# Patient Record
Sex: MALE | Race: WHITE | URBAN - METROPOLITAN AREA
[De-identification: names, ages, dates, MRNs, and addresses within clinical notes are randomized per-mention and may not be internally consistent; named-entity substitution may affect disease eponyms.]

---

## 2017-01-20 ENCOUNTER — OFFICE VISIT (OUTPATIENT)
Dept: FAMILY MEDICINE CLINIC | Age: 67
End: 2017-01-20

## 2017-01-20 VITALS
OXYGEN SATURATION: 98 % | HEIGHT: 70 IN | WEIGHT: 166 LBS | HEART RATE: 70 BPM | BODY MASS INDEX: 23.77 KG/M2 | DIASTOLIC BLOOD PRESSURE: 80 MMHG | SYSTOLIC BLOOD PRESSURE: 140 MMHG

## 2017-01-20 DIAGNOSIS — J30.9 ALLERGIC SINUSITIS: ICD-10-CM

## 2017-01-20 DIAGNOSIS — J01.00 ACUTE NON-RECURRENT MAXILLARY SINUSITIS: Primary | ICD-10-CM

## 2017-01-20 PROCEDURE — 1123F ACP DISCUSS/DSCN MKR DOCD: CPT | Performed by: FAMILY MEDICINE

## 2017-01-20 PROCEDURE — G8484 FLU IMMUNIZE NO ADMIN: HCPCS | Performed by: FAMILY MEDICINE

## 2017-01-20 PROCEDURE — 99203 OFFICE O/P NEW LOW 30 MIN: CPT | Performed by: FAMILY MEDICINE

## 2017-01-20 PROCEDURE — 3017F COLORECTAL CA SCREEN DOC REV: CPT | Performed by: FAMILY MEDICINE

## 2017-01-20 PROCEDURE — 4040F PNEUMOC VAC/ADMIN/RCVD: CPT | Performed by: FAMILY MEDICINE

## 2017-01-20 PROCEDURE — 1036F TOBACCO NON-USER: CPT | Performed by: FAMILY MEDICINE

## 2017-01-20 PROCEDURE — G8427 DOCREV CUR MEDS BY ELIG CLIN: HCPCS | Performed by: FAMILY MEDICINE

## 2017-01-20 PROCEDURE — G8420 CALC BMI NORM PARAMETERS: HCPCS | Performed by: FAMILY MEDICINE

## 2017-01-20 RX ORDER — DILTIAZEM HYDROCHLORIDE 240 MG/1
240 CAPSULE, EXTENDED RELEASE ORAL DAILY
COMMUNITY

## 2017-01-20 RX ORDER — CETIRIZINE HYDROCHLORIDE 10 MG/1
10 TABLET ORAL DAILY
Qty: 30 TABLET | Refills: 5 | Status: SHIPPED | OUTPATIENT
Start: 2017-01-20

## 2017-01-20 RX ORDER — AZITHROMYCIN 250 MG/1
TABLET, FILM COATED ORAL
Qty: 1 PACKET | Refills: 0 | Status: SHIPPED | OUTPATIENT
Start: 2017-01-20 | End: 2017-01-30

## 2017-01-20 ASSESSMENT — ENCOUNTER SYMPTOMS
COUGH: 0
EYE DISCHARGE: 0
ABDOMINAL PAIN: 0
SINUS PRESSURE: 1
CHEST TIGHTNESS: 0
FACIAL SWELLING: 0
SINUS COMPLAINT: 1
SHORTNESS OF BREATH: 0
COLOR CHANGE: 0
ABDOMINAL DISTENTION: 0
RHINORRHEA: 1
EYE REDNESS: 0

## 2023-11-03 ENCOUNTER — OFFICE VISIT (OUTPATIENT)
Dept: URGENT CARE | Age: 73
End: 2023-11-03

## 2023-11-03 VITALS
DIASTOLIC BLOOD PRESSURE: 73 MMHG | TEMPERATURE: 98.7 F | OXYGEN SATURATION: 94 % | SYSTOLIC BLOOD PRESSURE: 144 MMHG | WEIGHT: 158 LBS | BODY MASS INDEX: 22.67 KG/M2 | RESPIRATION RATE: 16 BRPM | HEART RATE: 78 BPM

## 2023-11-03 DIAGNOSIS — R53.83 FATIGUE, UNSPECIFIED TYPE: ICD-10-CM

## 2023-11-03 DIAGNOSIS — M79.10 MYALGIA: Primary | ICD-10-CM

## 2023-11-03 LAB
INFLUENZA VIRUS A RNA: NEGATIVE
INFLUENZA VIRUS B RNA: NEGATIVE
Lab: NORMAL
QC PASS/FAIL: NORMAL
SARS-COV-2 RDRP RESP QL NAA+PROBE: NEGATIVE

## 2023-11-03 RX ORDER — DIAZEPAM 10 MG/1
10 TABLET ORAL
COMMUNITY
Start: 2023-09-22

## 2023-11-03 RX ORDER — LOPERAMIDE HYDROCHLORIDE 2 MG/1
2 CAPSULE ORAL
COMMUNITY
Start: 2023-07-26

## 2023-11-03 RX ORDER — ASPIRIN 81 MG/1
1 TABLET ORAL DAILY
COMMUNITY
Start: 2019-05-16

## 2023-11-03 RX ORDER — LORATADINE 10 MG/1
10 TABLET ORAL
COMMUNITY
Start: 2023-09-25

## 2023-11-03 RX ORDER — QUETIAPINE FUMARATE 25 MG/1
25 TABLET, FILM COATED ORAL
COMMUNITY
Start: 2023-07-10

## 2023-11-03 RX ORDER — CARVEDILOL 6.25 MG/1
6.25 TABLET ORAL
COMMUNITY
Start: 2023-04-19

## 2023-11-03 RX ORDER — PRAVASTATIN SODIUM 40 MG
20 TABLET ORAL
COMMUNITY
Start: 2023-07-05

## 2023-11-03 ASSESSMENT — ENCOUNTER SYMPTOMS
NAUSEA: 0
DIARRHEA: 0
SORE THROAT: 0
RHINORRHEA: 0
SHORTNESS OF BREATH: 0
VOMITING: 0
COUGH: 0
ABDOMINAL PAIN: 0

## 2023-11-03 NOTE — PATIENT INSTRUCTIONS
In-clinic COVID and Flu testing negative. Continue use of Tylenol for breaking the chills episodes. Follow up with your PCP or return to the clinic in 5 days if symptoms persist or if symptoms worsen.

## 2023-11-03 NOTE — PROGRESS NOTES
Alli Castillo (: 1950) is a 75 y.o. male,New patient, here for evaluation of the following chief complaint(s):  Fever (Fevers and chills 3 days)      ASSESSMENT/PLAN:    ICD-10-CM    1. Myalgia  M79.10 POCT Influenza A/B DNA (Alere i)     POCT COVID-19 Rapid, NAAT      2. Fatigue, unspecified type  R53.83 POCT Influenza A/B DNA (Alere i)     POCT COVID-19 Rapid, NAAT          In-clinic COVID and Flu testing negative. Exam reassuring at this time with no ill findings. Likely low-level viral infection. Recommend continuing use of Tylenol to break symptoms, as necessary. Follow up with your PCP or return to the clinic in 5 days if symptoms persist or if symptoms worsen. Discussed pt's elevated BP noted during initial vital signs assessment - pt notes concern for having history of white coat syndrome. Pt does not he has taken his BP medications today, as directed. Discussed continued at-home monitoring of BP and follow up with PCP should elevated BP readings persist at home. If headaches, worsened back pain, abdominal pain, chest pain, shortness of breath, weakness, numbness, or worsened concerns develop, follow up with the ER for further evaluation. SUBJECTIVE/OBJECTIVE:  HPI:   75 y.o. male presents with his wife for complaint of fevers (subjective per the wife) and chills x 4 days. Notes concerns for having the flu. States the chills will last an hour or so, then resolve. Also notes having body aches. States will take Tylenol when he first notes having the chills, then they resolve in roughly an hour, and notes feeling much better once the chills resolve. States episodes of chills occur 2-3 times per day, at least one episode during the day, and one in the evening. States feels quite well between the episodes. Pt is asking for COVID and Flu testing.       History provided by:  Patient   used: No        VITAL SIGNS  Vitals:    23 0955   BP: (!) 144/73   Pulse:

## 2023-11-07 ENCOUNTER — OFFICE VISIT (OUTPATIENT)
Dept: URGENT CARE | Age: 73
End: 2023-11-07

## 2023-11-07 VITALS
TEMPERATURE: 98.8 F | HEART RATE: 75 BPM | OXYGEN SATURATION: 94 % | DIASTOLIC BLOOD PRESSURE: 75 MMHG | WEIGHT: 158.8 LBS | BODY MASS INDEX: 22.73 KG/M2 | HEIGHT: 70 IN | SYSTOLIC BLOOD PRESSURE: 148 MMHG

## 2023-11-07 DIAGNOSIS — R31.9 URINARY TRACT INFECTION WITH HEMATURIA, SITE UNSPECIFIED: Primary | ICD-10-CM

## 2023-11-07 DIAGNOSIS — R03.0 ELEVATED BLOOD PRESSURE READING: ICD-10-CM

## 2023-11-07 DIAGNOSIS — N39.0 URINARY TRACT INFECTION WITH HEMATURIA, SITE UNSPECIFIED: Primary | ICD-10-CM

## 2023-11-07 DIAGNOSIS — R30.9 PAINFUL URINATION: ICD-10-CM

## 2023-11-07 PROBLEM — I25.10 CORONARY ATHEROSCLEROSIS: Status: ACTIVE | Noted: 2023-11-07

## 2023-11-07 PROBLEM — C18.9 MALIGNANT NEOPLASM OF COLON, UNSPECIFIED (HCC): Status: ACTIVE | Noted: 2023-11-07

## 2023-11-07 PROBLEM — C22.9 MALIGNANT NEOPLASM OF LIVER (HCC): Status: ACTIVE | Noted: 2023-11-07

## 2023-11-07 PROBLEM — F33.1 MAJOR DEPRESSIVE DISORDER, RECURRENT, MODERATE (HCC): Status: ACTIVE | Noted: 2023-11-07

## 2023-11-07 PROBLEM — H65.00 ACUTE SEROUS OTITIS MEDIA: Status: ACTIVE | Noted: 2023-11-07

## 2023-11-07 LAB
APPEARANCE FLUID: ABNORMAL
BILIRUBIN, POC: NEGATIVE
BLOOD URINE, POC: ABNORMAL
CLARITY, POC: ABNORMAL
COLOR, POC: ABNORMAL
GLUCOSE URINE, POC: NEGATIVE
KETONES, POC: NEGATIVE
LEUKOCYTE EST, POC: ABNORMAL
NITRITE, POC: ABNORMAL
PH, POC: 6
PROTEIN, POC: ABNORMAL
SPECIFIC GRAVITY, POC: >=1.03
UROBILINOGEN, POC: ABNORMAL

## 2023-11-07 RX ORDER — SULFAMETHOXAZOLE AND TRIMETHOPRIM 800; 160 MG/1; MG/1
1 TABLET ORAL 2 TIMES DAILY
Qty: 20 TABLET | Refills: 0 | Status: SHIPPED | OUTPATIENT
Start: 2023-11-07 | End: 2023-11-17

## 2023-11-07 RX ORDER — CEPHALEXIN 500 MG/1
500 CAPSULE ORAL 2 TIMES DAILY
Qty: 14 CAPSULE | Refills: 0 | Status: CANCELLED | OUTPATIENT
Start: 2023-11-07 | End: 2023-11-14

## 2023-11-07 ASSESSMENT — ENCOUNTER SYMPTOMS
ABDOMINAL PAIN: 0
CHEST TIGHTNESS: 0
SHORTNESS OF BREATH: 0
NAUSEA: 0
VOMITING: 0
DIARRHEA: 0
CONSTIPATION: 0
COUGH: 0

## 2023-11-07 NOTE — PATIENT INSTRUCTIONS
Please go to Emergency department if you have worsening pain, worsening bleeding.    Please follow up with your family doctor to ensure the resolution  We have sent your urine out for a culture and we will call with results to ensure that you are on correct antibiotics

## 2023-11-10 LAB
BACTERIA UR CULT: ABNORMAL
ORGANISM: ABNORMAL

## 2023-11-13 ENCOUNTER — TELEPHONE (OUTPATIENT)
Dept: FAMILY MEDICINE CLINIC | Age: 73
End: 2023-11-13

## 2023-11-13 NOTE — TELEPHONE ENCOUNTER
----- Message from Betzy Bustos sent at 11/13/2023 12:34 PM EST -----  Subject: Appointment Request    Reason for Call: New Patient/New to Provider Appointment needed: Routine   ED Follow Up Visit    QUESTIONS    Reason for appointment request? No appointments available during search     Additional Information for Provider? Patient needs a urgent care f/u. Has   seen Dr. Ron Avila in the past, who sees his wife Collette Eve. Patient   usually goes to the [place] but would like to establish care and schedule a f/u   appt for a kidney infection.  Please call to advise.   ---------------------------------------------------------------------------  --------------  Grace Marker INFO  [phone]; OK to leave message on voicemail  ---------------------------------------------------------------------------  --------------  SCRIPT ANSWERS

## 2023-11-17 ENCOUNTER — OFFICE VISIT (OUTPATIENT)
Dept: FAMILY MEDICINE CLINIC | Age: 73
End: 2023-11-17
Payer: MEDICARE

## 2023-11-17 VITALS
DIASTOLIC BLOOD PRESSURE: 77 MMHG | HEART RATE: 52 BPM | WEIGHT: 158.4 LBS | SYSTOLIC BLOOD PRESSURE: 124 MMHG | HEIGHT: 70 IN | BODY MASS INDEX: 22.68 KG/M2 | OXYGEN SATURATION: 99 %

## 2023-11-17 DIAGNOSIS — R33.9 RETENTION OF URINE: Primary | ICD-10-CM

## 2023-11-17 DIAGNOSIS — R30.0 DYSURIA: ICD-10-CM

## 2023-11-17 LAB
BILIRUBIN, POC: NEGATIVE
BLOOD URINE, POC: NEGATIVE
CLARITY, POC: CLEAR
COLOR, POC: YELLOW
GLUCOSE URINE, POC: NEGATIVE
KETONES, POC: NEGATIVE
LEUKOCYTE EST, POC: NEGATIVE
NITRITE, POC: NEGATIVE
PH, POC: 5.5
PROTEIN, POC: NEGATIVE
SPECIFIC GRAVITY, POC: 1.02
UROBILINOGEN, POC: 0.2

## 2023-11-17 PROCEDURE — G8427 DOCREV CUR MEDS BY ELIG CLIN: HCPCS | Performed by: SURGERY

## 2023-11-17 PROCEDURE — 99203 OFFICE O/P NEW LOW 30 MIN: CPT | Performed by: SURGERY

## 2023-11-17 PROCEDURE — 3074F SYST BP LT 130 MM HG: CPT | Performed by: SURGERY

## 2023-11-17 PROCEDURE — 3078F DIAST BP <80 MM HG: CPT | Performed by: SURGERY

## 2023-11-17 PROCEDURE — G8420 CALC BMI NORM PARAMETERS: HCPCS | Performed by: SURGERY

## 2023-11-17 PROCEDURE — 1036F TOBACCO NON-USER: CPT | Performed by: SURGERY

## 2023-11-17 PROCEDURE — 1123F ACP DISCUSS/DSCN MKR DOCD: CPT | Performed by: SURGERY

## 2023-11-17 PROCEDURE — G8484 FLU IMMUNIZE NO ADMIN: HCPCS | Performed by: SURGERY

## 2023-11-17 PROCEDURE — 3017F COLORECTAL CA SCREEN DOC REV: CPT | Performed by: SURGERY

## 2023-11-17 PROCEDURE — 81002 URINALYSIS NONAUTO W/O SCOPE: CPT | Performed by: SURGERY

## 2023-11-17 RX ORDER — TAMSULOSIN HYDROCHLORIDE 0.4 MG/1
0.4 CAPSULE ORAL DAILY
Qty: 30 CAPSULE | Refills: 0 | Status: SHIPPED | OUTPATIENT
Start: 2023-11-17

## 2023-11-17 SDOH — ECONOMIC STABILITY: INCOME INSECURITY: HOW HARD IS IT FOR YOU TO PAY FOR THE VERY BASICS LIKE FOOD, HOUSING, MEDICAL CARE, AND HEATING?: NOT HARD AT ALL

## 2023-11-17 SDOH — ECONOMIC STABILITY: HOUSING INSECURITY
IN THE LAST 12 MONTHS, WAS THERE A TIME WHEN YOU DID NOT HAVE A STEADY PLACE TO SLEEP OR SLEPT IN A SHELTER (INCLUDING NOW)?: NO

## 2023-11-17 SDOH — ECONOMIC STABILITY: FOOD INSECURITY: WITHIN THE PAST 12 MONTHS, YOU WORRIED THAT YOUR FOOD WOULD RUN OUT BEFORE YOU GOT MONEY TO BUY MORE.: NEVER TRUE

## 2023-11-17 SDOH — ECONOMIC STABILITY: FOOD INSECURITY: WITHIN THE PAST 12 MONTHS, THE FOOD YOU BOUGHT JUST DIDN'T LAST AND YOU DIDN'T HAVE MONEY TO GET MORE.: NEVER TRUE

## 2023-11-17 ASSESSMENT — PATIENT HEALTH QUESTIONNAIRE - PHQ9
SUM OF ALL RESPONSES TO PHQ QUESTIONS 1-9: 2
2. FEELING DOWN, DEPRESSED OR HOPELESS: 1
SUM OF ALL RESPONSES TO PHQ QUESTIONS 1-9: 2
9. THOUGHTS THAT YOU WOULD BE BETTER OFF DEAD, OR OF HURTING YOURSELF: 0
6. FEELING BAD ABOUT YOURSELF - OR THAT YOU ARE A FAILURE OR HAVE LET YOURSELF OR YOUR FAMILY DOWN: 0
5. POOR APPETITE OR OVEREATING: 0
4. FEELING TIRED OR HAVING LITTLE ENERGY: 0
3. TROUBLE FALLING OR STAYING ASLEEP: 1
SUM OF ALL RESPONSES TO PHQ QUESTIONS 1-9: 2
8. MOVING OR SPEAKING SO SLOWLY THAT OTHER PEOPLE COULD HAVE NOTICED. OR THE OPPOSITE, BEING SO FIGETY OR RESTLESS THAT YOU HAVE BEEN MOVING AROUND A LOT MORE THAN USUAL: 0
10. IF YOU CHECKED OFF ANY PROBLEMS, HOW DIFFICULT HAVE THESE PROBLEMS MADE IT FOR YOU TO DO YOUR WORK, TAKE CARE OF THINGS AT HOME, OR GET ALONG WITH OTHER PEOPLE: 0
1. LITTLE INTEREST OR PLEASURE IN DOING THINGS: 0
SUM OF ALL RESPONSES TO PHQ9 QUESTIONS 1 & 2: 1
SUM OF ALL RESPONSES TO PHQ QUESTIONS 1-9: 2
7. TROUBLE CONCENTRATING ON THINGS, SUCH AS READING THE NEWSPAPER OR WATCHING TELEVISION: 0

## 2023-11-17 ASSESSMENT — ENCOUNTER SYMPTOMS
CONSTIPATION: 0
NAUSEA: 0
SORE THROAT: 0
DIARRHEA: 0
ABDOMINAL PAIN: 0
SHORTNESS OF BREATH: 0
COUGH: 0

## 2023-11-17 NOTE — PROGRESS NOTES
Hunger Vital Sign     Worried About Running Out of Food in the Last Year: Never true     Ran Out of Food in the Last Year: Never true   Transportation Needs: Unknown (11/17/2023)    PRAPARE - Transportation     Lack of Transportation (Medical): Not on file     Lack of Transportation (Non-Medical): No   Physical Activity: Not on file   Stress: Not on file   Social Connections: Not on file   Intimate Partner Violence: Not on file   Housing Stability: Unknown (11/17/2023)    Housing Stability Vital Sign     Unable to Pay for Housing in the Last Year: Not on file     Number of Places Lived in the Last Year: Not on file     Unstable Housing in the Last Year: No       Family History   Problem Relation Age of Onset    Diabetes Mother     Stroke Mother     Heart Disease Father     High Blood Pressure Father     Cancer Sister     Diabetes Sister     Cancer Brother     Diabetes Brother        Current Outpatient Medications   Medication Sig Dispense Refill    tamsulosin (FLOMAX) 0.4 MG capsule Take 1 capsule by mouth daily 30 capsule 0    aspirin 81 MG EC tablet Take 1 tablet by mouth daily      carvedilol (COREG) 6.25 MG tablet 1 tablet      pravastatin (PRAVACHOL) 40 MG tablet 0.5 tablets      QUEtiapine (SEROQUEL) 25 MG tablet 1 tablet      loperamide (IMODIUM) 2 MG capsule 1 capsule      loratadine (CLARITIN) 10 MG tablet 1 tablet      diltiazem (DILACOR XR) 240 MG extended release capsule Take 1 capsule by mouth daily      cetirizine (ZYRTEC ALLERGY) 10 MG tablet Take 1 tablet by mouth daily 30 tablet 5     No current facility-administered medications for this visit.        Allergies   Allergen Reactions    Penicillins Hives    Gemfibrozil Other (See Comments)       Office Visit on 11/07/2023   Component Date Value Ref Range Status    Color, UA 11/07/2023 Dark Yellow   Final    Glucose, UA POC 11/07/2023 Negative   Final    Bilirubin, UA 11/07/2023 Negative   Final    Ketones, UA 11/07/2023 Negative   Final    Spec

## 2023-11-29 ENCOUNTER — OFFICE VISIT (OUTPATIENT)
Dept: FAMILY MEDICINE CLINIC | Age: 73
End: 2023-11-29
Payer: MEDICARE

## 2023-11-29 VITALS
OXYGEN SATURATION: 96 % | SYSTOLIC BLOOD PRESSURE: 138 MMHG | TEMPERATURE: 97.1 F | DIASTOLIC BLOOD PRESSURE: 74 MMHG | HEART RATE: 69 BPM | BODY MASS INDEX: 23.14 KG/M2 | RESPIRATION RATE: 16 BRPM | WEIGHT: 159 LBS

## 2023-11-29 DIAGNOSIS — R33.9 URINARY RETENTION: Primary | ICD-10-CM

## 2023-11-29 DIAGNOSIS — R31.9 HEMATURIA, UNSPECIFIED TYPE: ICD-10-CM

## 2023-11-29 DIAGNOSIS — N30.01 ACUTE CYSTITIS WITH HEMATURIA: ICD-10-CM

## 2023-11-29 LAB
BILIRUBIN, POC: NEGATIVE
BLOOD URINE, POC: NORMAL
CLARITY, POC: NORMAL
COLOR, POC: NORMAL
GLUCOSE URINE, POC: NEGATIVE
KETONES, POC: NEGATIVE
LEUKOCYTE EST, POC: NORMAL
NITRITE, POC: POSITIVE
PH, POC: 5.5
PROTEIN, POC: NORMAL
SPECIFIC GRAVITY, POC: >=1.03
UROBILINOGEN, POC: NORMAL

## 2023-11-29 PROCEDURE — 99213 OFFICE O/P EST LOW 20 MIN: CPT | Performed by: FAMILY MEDICINE

## 2023-11-29 PROCEDURE — 3075F SYST BP GE 130 - 139MM HG: CPT | Performed by: FAMILY MEDICINE

## 2023-11-29 PROCEDURE — 1123F ACP DISCUSS/DSCN MKR DOCD: CPT | Performed by: FAMILY MEDICINE

## 2023-11-29 PROCEDURE — 3078F DIAST BP <80 MM HG: CPT | Performed by: FAMILY MEDICINE

## 2023-11-29 PROCEDURE — 81003 URINALYSIS AUTO W/O SCOPE: CPT | Performed by: FAMILY MEDICINE

## 2023-11-29 PROCEDURE — 1036F TOBACCO NON-USER: CPT | Performed by: FAMILY MEDICINE

## 2023-11-29 PROCEDURE — G8427 DOCREV CUR MEDS BY ELIG CLIN: HCPCS | Performed by: FAMILY MEDICINE

## 2023-11-29 PROCEDURE — G8484 FLU IMMUNIZE NO ADMIN: HCPCS | Performed by: FAMILY MEDICINE

## 2023-11-29 PROCEDURE — G8420 CALC BMI NORM PARAMETERS: HCPCS | Performed by: FAMILY MEDICINE

## 2023-11-29 PROCEDURE — 3017F COLORECTAL CA SCREEN DOC REV: CPT | Performed by: FAMILY MEDICINE

## 2023-11-29 RX ORDER — SULFAMETHOXAZOLE AND TRIMETHOPRIM 800; 160 MG/1; MG/1
1 TABLET ORAL 2 TIMES DAILY
Qty: 20 TABLET | Refills: 0 | Status: SHIPPED | OUTPATIENT
Start: 2023-11-29 | End: 2023-12-09

## 2023-11-29 ASSESSMENT — COLUMBIA-SUICIDE SEVERITY RATING SCALE - C-SSRS
7. DID THIS OCCUR IN THE LAST THREE MONTHS: NO
3. HAVE YOU BEEN THINKING ABOUT HOW YOU MIGHT KILL YOURSELF?: NO
5. HAVE YOU STARTED TO WORK OUT OR WORKED OUT THE DETAILS OF HOW TO KILL YOURSELF? DO YOU INTEND TO CARRY OUT THIS PLAN?: NO
4. HAVE YOU HAD THESE THOUGHTS AND HAD SOME INTENTION OF ACTING ON THEM?: NO

## 2023-11-29 ASSESSMENT — PATIENT HEALTH QUESTIONNAIRE - PHQ9
1. LITTLE INTEREST OR PLEASURE IN DOING THINGS: 0
5. POOR APPETITE OR OVEREATING: 0
2. FEELING DOWN, DEPRESSED OR HOPELESS: 0
4. FEELING TIRED OR HAVING LITTLE ENERGY: 0
10. IF YOU CHECKED OFF ANY PROBLEMS, HOW DIFFICULT HAVE THESE PROBLEMS MADE IT FOR YOU TO DO YOUR WORK, TAKE CARE OF THINGS AT HOME, OR GET ALONG WITH OTHER PEOPLE: 0
6. FEELING BAD ABOUT YOURSELF - OR THAT YOU ARE A FAILURE OR HAVE LET YOURSELF OR YOUR FAMILY DOWN: 0
7. TROUBLE CONCENTRATING ON THINGS, SUCH AS READING THE NEWSPAPER OR WATCHING TELEVISION: 0
SUM OF ALL RESPONSES TO PHQ9 QUESTIONS 1 & 2: 0
8. MOVING OR SPEAKING SO SLOWLY THAT OTHER PEOPLE COULD HAVE NOTICED. OR THE OPPOSITE, BEING SO FIGETY OR RESTLESS THAT YOU HAVE BEEN MOVING AROUND A LOT MORE THAN USUAL: 0
SUM OF ALL RESPONSES TO PHQ QUESTIONS 1-9: 0
3. TROUBLE FALLING OR STAYING ASLEEP: 0
SUM OF ALL RESPONSES TO PHQ QUESTIONS 1-9: 0
9. THOUGHTS THAT YOU WOULD BE BETTER OFF DEAD, OR OF HURTING YOURSELF: 0
SUM OF ALL RESPONSES TO PHQ QUESTIONS 1-9: 0
SUM OF ALL RESPONSES TO PHQ QUESTIONS 1-9: 0

## 2023-12-02 LAB
BACTERIA UR CULT: ABNORMAL
ORGANISM: ABNORMAL

## 2024-01-02 ENCOUNTER — TELEPHONE (OUTPATIENT)
Dept: FAMILY MEDICINE CLINIC | Age: 74
End: 2024-01-02

## 2024-01-02 DIAGNOSIS — R53.83 FATIGUE, UNSPECIFIED TYPE: Primary | ICD-10-CM

## 2024-01-02 NOTE — TELEPHONE ENCOUNTER
Patient's spouse called in an stated the patient has not been feeling well for a long time, she states he has no energy, can't hardly stand up, sleeps a lot and just very weak. She states the patient is requesting Nubia Davila DO to call in orders for blood work, to see if he has a underlying infection, appointment offered, she stated he was just in last month.       LOV: 12/22/2023      No future appointments.

## 2024-01-05 DIAGNOSIS — R53.83 FATIGUE, UNSPECIFIED TYPE: ICD-10-CM

## 2024-01-05 LAB
ALBUMIN SERPL-MCNC: 4.1 G/DL (ref 3.4–5)
ALBUMIN/GLOB SERPL: 1.2 {RATIO} (ref 1.1–2.2)
ALP SERPL-CCNC: 62 U/L (ref 40–129)
ALT SERPL-CCNC: 8 U/L (ref 10–40)
ANION GAP SERPL CALCULATED.3IONS-SCNC: 14 MMOL/L (ref 3–16)
AST SERPL-CCNC: 13 U/L (ref 15–37)
BASOPHILS # BLD: 0 K/UL (ref 0–0.2)
BASOPHILS NFR BLD: 0.1 %
BILIRUB SERPL-MCNC: 0.8 MG/DL (ref 0–1)
BUN SERPL-MCNC: 12 MG/DL (ref 7–20)
CALCIUM SERPL-MCNC: 9.1 MG/DL (ref 8.3–10.6)
CHLORIDE SERPL-SCNC: 100 MMOL/L (ref 99–110)
CO2 SERPL-SCNC: 24 MMOL/L (ref 21–32)
CREAT SERPL-MCNC: 1 MG/DL (ref 0.8–1.3)
DEPRECATED RDW RBC AUTO: 14.2 % (ref 12.4–15.4)
EOSINOPHIL # BLD: 0 K/UL (ref 0–0.6)
EOSINOPHIL NFR BLD: 0.4 %
GFR SERPLBLD CREATININE-BSD FMLA CKD-EPI: >60 ML/MIN/{1.73_M2}
GLUCOSE SERPL-MCNC: 115 MG/DL (ref 70–99)
HCT VFR BLD AUTO: 39.8 % (ref 40.5–52.5)
HGB BLD-MCNC: 13.7 G/DL (ref 13.5–17.5)
LYMPHOCYTES # BLD: 2 K/UL (ref 1–5.1)
LYMPHOCYTES NFR BLD: 30.8 %
MCH RBC QN AUTO: 30.8 PG (ref 26–34)
MCHC RBC AUTO-ENTMCNC: 34.3 G/DL (ref 31–36)
MCV RBC AUTO: 89.8 FL (ref 80–100)
MONOCYTES # BLD: 0.6 K/UL (ref 0–1.3)
MONOCYTES NFR BLD: 8.5 %
NEUTROPHILS # BLD: 3.9 K/UL (ref 1.7–7.7)
NEUTROPHILS NFR BLD: 60.2 %
PLATELET # BLD AUTO: 167 K/UL (ref 135–450)
PMV BLD AUTO: 8.1 FL (ref 5–10.5)
POTASSIUM SERPL-SCNC: 3.7 MMOL/L (ref 3.5–5.1)
PROT SERPL-MCNC: 7.5 G/DL (ref 6.4–8.2)
RBC # BLD AUTO: 4.44 M/UL (ref 4.2–5.9)
SODIUM SERPL-SCNC: 138 MMOL/L (ref 136–145)
TSH SERPL DL<=0.005 MIU/L-ACNC: 3.72 UIU/ML (ref 0.27–4.2)
WBC # BLD AUTO: 6.5 K/UL (ref 4–11)

## 2024-01-25 DIAGNOSIS — R33.9 RETENTION OF URINE: ICD-10-CM

## 2024-01-25 RX ORDER — TAMSULOSIN HYDROCHLORIDE 0.4 MG/1
0.4 CAPSULE ORAL DAILY
Qty: 30 CAPSULE | Refills: 0 | Status: SHIPPED | OUTPATIENT
Start: 2024-01-25

## 2024-02-15 LAB
C DIFFICILE TOXINS, PCR: NORMAL
SOURCE: NORMAL

## 2024-02-22 ENCOUNTER — OFFICE VISIT (OUTPATIENT)
Dept: FAMILY MEDICINE CLINIC | Age: 74
End: 2024-02-22
Payer: MEDICARE

## 2024-02-22 VITALS
BODY MASS INDEX: 23.43 KG/M2 | WEIGHT: 158.2 LBS | OXYGEN SATURATION: 95 % | SYSTOLIC BLOOD PRESSURE: 127 MMHG | HEIGHT: 69 IN | DIASTOLIC BLOOD PRESSURE: 75 MMHG | HEART RATE: 68 BPM

## 2024-02-22 DIAGNOSIS — Z01.818 PREOP EXAMINATION: Primary | ICD-10-CM

## 2024-02-22 PROCEDURE — 99213 OFFICE O/P EST LOW 20 MIN: CPT | Performed by: SURGERY

## 2024-02-22 PROCEDURE — 93000 ELECTROCARDIOGRAM COMPLETE: CPT | Performed by: SURGERY

## 2024-02-22 PROCEDURE — G8420 CALC BMI NORM PARAMETERS: HCPCS | Performed by: SURGERY

## 2024-02-22 PROCEDURE — 1123F ACP DISCUSS/DSCN MKR DOCD: CPT | Performed by: SURGERY

## 2024-02-22 PROCEDURE — G8427 DOCREV CUR MEDS BY ELIG CLIN: HCPCS | Performed by: SURGERY

## 2024-02-22 PROCEDURE — 3078F DIAST BP <80 MM HG: CPT | Performed by: SURGERY

## 2024-02-22 PROCEDURE — G8484 FLU IMMUNIZE NO ADMIN: HCPCS | Performed by: SURGERY

## 2024-02-22 PROCEDURE — 1036F TOBACCO NON-USER: CPT | Performed by: SURGERY

## 2024-02-22 PROCEDURE — 3017F COLORECTAL CA SCREEN DOC REV: CPT | Performed by: SURGERY

## 2024-02-22 PROCEDURE — 3074F SYST BP LT 130 MM HG: CPT | Performed by: SURGERY

## 2024-02-22 ASSESSMENT — PATIENT HEALTH QUESTIONNAIRE - PHQ9
4. FEELING TIRED OR HAVING LITTLE ENERGY: 3
3. TROUBLE FALLING OR STAYING ASLEEP: 0
SUM OF ALL RESPONSES TO PHQ9 QUESTIONS 1 & 2: 0
8. MOVING OR SPEAKING SO SLOWLY THAT OTHER PEOPLE COULD HAVE NOTICED. OR THE OPPOSITE, BEING SO FIGETY OR RESTLESS THAT YOU HAVE BEEN MOVING AROUND A LOT MORE THAN USUAL: 0
SUM OF ALL RESPONSES TO PHQ QUESTIONS 1-9: 4
SUM OF ALL RESPONSES TO PHQ QUESTIONS 1-9: 4
7. TROUBLE CONCENTRATING ON THINGS, SUCH AS READING THE NEWSPAPER OR WATCHING TELEVISION: 0
2. FEELING DOWN, DEPRESSED OR HOPELESS: 0
6. FEELING BAD ABOUT YOURSELF - OR THAT YOU ARE A FAILURE OR HAVE LET YOURSELF OR YOUR FAMILY DOWN: 0
10. IF YOU CHECKED OFF ANY PROBLEMS, HOW DIFFICULT HAVE THESE PROBLEMS MADE IT FOR YOU TO DO YOUR WORK, TAKE CARE OF THINGS AT HOME, OR GET ALONG WITH OTHER PEOPLE: 0
5. POOR APPETITE OR OVEREATING: 1
1. LITTLE INTEREST OR PLEASURE IN DOING THINGS: 0
9. THOUGHTS THAT YOU WOULD BE BETTER OFF DEAD, OR OF HURTING YOURSELF: 0
SUM OF ALL RESPONSES TO PHQ QUESTIONS 1-9: 4
SUM OF ALL RESPONSES TO PHQ QUESTIONS 1-9: 4

## 2024-02-22 ASSESSMENT — ENCOUNTER SYMPTOMS
SORE THROAT: 0
NAUSEA: 0
DIARRHEA: 0
COUGH: 0
SHORTNESS OF BREATH: 0
ABDOMINAL PAIN: 0
CONSTIPATION: 0

## 2024-02-22 NOTE — PROGRESS NOTES
and Affect: Mood normal.           DATA:  EKG:  Date:  2/22/24    I have reviewed EKG with the following interpretation:    Impression:  Sinus rhythm with baseline artifact (likely due to body hair)      Labs ordered by surgeon    ASSESSMENT AND PLAN:    1.  Patient is a 73 y.o. male with above specified procedure [facility] 3[facility]4/24 [facility]   They will not require cardiology evaluation prior to procedure.     2. Stop NSAID medications 7-10 days prior to procedure.    3. Cleared for surgery.        Nubia Goldberg, DO

## 2024-02-23 NOTE — PROGRESS NOTES
Moderate caliber and no disease. Tortuous vessel. No          disease.     CFX: Large caliber and dominant. No disease. 2 moderate          caliber OMs. Small caliber LPDA without stenosis.            Assessment:      1. Pre-operative evaluation  2. Exertional dyspnea  3. Hematuria  4. Non-ischemic cardiomyopathy  5. Essential hypertension  6. Hyperlipidemia     Plan:      1. The [facility] but says that he has been seen by multiple providers, some of whom have been outside the [facility] and he is unable to provide any names for these providers.  Per review of his available records in Epic, he had a TTE in 2009 that showed an LVEF of 40% with inferior, septal, and inferolateral hypokinesis.  Invasive angiography performed around that time showed normal coronary arteries.  LVEF was reported as 50% on left ventriculography at that time.  He reports that he had an echocardiogram and underwent invasive coronary angiography in October in [place], but cannot identify where specifically he had the echo and angiogram performed.  He says that he did not have to have any stents placed.  He denies any chest pain, but some report some exertional dyspnea.  His EKG from 2/22/24 shows normal sinus rhythm with non-specific intraventricular conduction delay and no ischemic changes or evidence of previous infarct.  He currently appears to be near a clinically euvolemic state.  He is taking aspirin, pravastatin, coreg, and diltiazem, but does not appear to be have been on an ACEI or ARB.  2. Cystoscopy is considered a low risk procedure, but will work on obtaining outside records from [facility] before formally commenting on cardiac risk.  3. Can continue aspirin 81 mg daily, pravastatin 20 mg daily, coreg 6.25 mg BID, and diltiazem 240 mg daily.  4. If LVEF found to be reduced on most recent reported TTE, can plan to add ACEI/ARB and can probable discontinue diltiazem.  5.

## 2024-02-26 ENCOUNTER — OFFICE VISIT (OUTPATIENT)
Dept: CARDIOLOGY CLINIC | Age: 74
End: 2024-02-26

## 2024-02-26 ENCOUNTER — TELEPHONE (OUTPATIENT)
Dept: CARDIOLOGY CLINIC | Age: 74
End: 2024-02-26

## 2024-02-26 VITALS
OXYGEN SATURATION: 99 % | SYSTOLIC BLOOD PRESSURE: 136 MMHG | BODY MASS INDEX: 23.25 KG/M2 | HEART RATE: 54 BPM | WEIGHT: 157 LBS | DIASTOLIC BLOOD PRESSURE: 66 MMHG | HEIGHT: 69 IN

## 2024-02-26 DIAGNOSIS — I42.8 NON-ISCHEMIC CARDIOMYOPATHY (HCC): ICD-10-CM

## 2024-02-26 DIAGNOSIS — R31.9 HEMATURIA, UNSPECIFIED TYPE: ICD-10-CM

## 2024-02-26 DIAGNOSIS — I10 ESSENTIAL HYPERTENSION: ICD-10-CM

## 2024-02-26 DIAGNOSIS — Z01.818 PRE-OP EVALUATION: Primary | ICD-10-CM

## 2024-02-26 DIAGNOSIS — R06.09 EXERTIONAL DYSPNEA: ICD-10-CM

## 2024-02-26 DIAGNOSIS — E78.5 HYPERLIPIDEMIA, UNSPECIFIED HYPERLIPIDEMIA TYPE: ICD-10-CM

## 2024-02-26 NOTE — PATIENT INSTRUCTIONS
Continue aspirin 81 mg daily, Coreg 6.25 mg twice daily, diltiazem 240 mg daily and pravastatin 20 mg daily.   We will call over to the [facility] for your medical records ([address] [facility]). Dr Sarahi Harmon, DO   Patient is acceptable cardiac risk for proceeding with the planned cystoscopy surgery  Once we have obtained your medical records we will decide what further testing is needed.   Follow up with me in 3 months

## 2024-02-26 NOTE — TELEPHONE ENCOUNTER
Delgado Gaviria,     Patient was seen today in [place] and had no records. He says he is [facility] location ([address] [place] [address]) and sees Dr Sarahi Harmon DO. He said he had recent Stress test, angiogram and echocardiogram in October 2023. Can you please help obtaining records from [facility]. And let us know when anything is scanned in so Dr Steinberg can review. Thanks Khadra

## 2024-02-27 NOTE — TELEPHONE ENCOUNTER
Khadra, these records need to be obtained as soon as possible to assist with his pre-operative evaluation.  Thanks.

## 2024-02-28 DIAGNOSIS — I10 HYPERTENSION, UNSPECIFIED TYPE: ICD-10-CM

## 2024-02-28 DIAGNOSIS — R06.02 SHORTNESS OF BREATH: Primary | ICD-10-CM

## 2024-02-28 NOTE — PROGRESS NOTES
[facility] available [facility] records were obtained.  There is no record of him having a stress test or heart catheterization in the fall of last year as was reported by the patient during his recent visit.  He seemed to indicate that this was done at an outside facility, but could not identify the facility at which it was done in [place].  [facility] was [facility] in 2014 and it also appears that he had a nuclear SPECT stress test performed there in May 2019.    He was seen in clinic earlier this week as a new patient for a pre-operative cardiac evaluation prior to undergoing a cystoscopy.  Cystoscopy is overall considered a low-risk procedure and the patient is acceptable cardiac risk for proceeding.    He did report some exertional dyspnea and would recommend obtaining a GXT nuclear SPECT stress test for further cardiac risk stratification (OK to convert to Lexiscan if does not achieve target heart rate with exercise) as well as a TTE to re-evaluate his cardiac function.  Please note that these tests do not need to delay proceeding with his cystoscopy, as this is again a low risk procedure and patient is acceptable risk for proceeding at this time.      Gunner Steinberg, DO   [facility]

## 2024-02-29 NOTE — PROGRESS NOTES
Relayed to pt, pt v/u. CS number given, tests ordered. Letter sent to urology group per pt. Will scan when confirmation is received.

## 2024-03-05 RX ORDER — LEVOFLOXACIN 500 MG/1
500 TABLET, FILM COATED ORAL DAILY
COMMUNITY

## 2024-03-05 NOTE — PROGRESS NOTES
[facility] PRE-OPERATIVE INSTRUCTIONS       DOS: __3/7/2024__        Pre-Op Instructions     [x]  A History and Physical will be required within 30 days prior to surgery date. Some patients may require cardiac or pulmonary clearance. H&P will be completed DOS for Endo/colonoscopy patients.     [x]  Reviewed Medical and Surgical history, medication list, confirmed with patient any implants, allergies, bleeding disorders, JUAN and reactions to Anesthesia.    [x]  If there is a change in physical condition between now and the day of surgery, please notify your surgeon. This includes a cough, cold, fever, sore throat, nausea, vomiting and diarrhea. Also notify your surgeon if you experience dizziness, shortness of breath or blurred vision.    [x] Reviewed hx of C-Diff, MRSA, VRE and/or recent use of Antibiotics     [x]  All patients having a procedure must have a ride home by a responsible person that is over the age of 18 and ensure it is someone that we can share medical information with. After discharge, a responsible adult needs to stay with you for 24 hours. There is a limit of 2 adult visitors per room.     If unable to secure ride and/or care taker, please contact surgeon's office.      [x]  No alcohol, smoking or marijuana use 24 hours prior to surgery. Any use of recreational drugs must be stopped 5 days prior to surgery.     [x]  NPO after midnight (Any heart, BP, seizure, thyroid and breathing medications are okay to take the morning of surgery with a small sip of water).    The morning of surgery, you may brush your teeth, just no swallowing water. Also, NO gum, candy, mints or ice chips.    []  For Colonoscopy's, follow prep-instructions as indicated by physician.     []  Patients with a insulin pump, keep set on basal rate on day of surgery. Long-acting insulin should be administered the evening before, take only half of usual dose. Always check with prescribing doctor if there are any

## 2024-03-06 ENCOUNTER — ANESTHESIA EVENT (OUTPATIENT)
Age: 74
End: 2024-03-06
Payer: MEDICARE

## 2024-03-07 ENCOUNTER — ANESTHESIA (OUTPATIENT)
Age: 74
End: 2024-03-07
Payer: MEDICARE

## 2024-03-07 ENCOUNTER — HOSPITAL ENCOUNTER (OUTPATIENT)
Age: 74
Setting detail: OUTPATIENT SURGERY
Discharge: HOME | End: 2024-03-07
Attending: UROLOGY | Admitting: UROLOGY
Payer: MEDICARE

## 2024-03-07 VITALS
DIASTOLIC BLOOD PRESSURE: 61 MMHG | OXYGEN SATURATION: 100 % | HEIGHT: 69 IN | WEIGHT: 151.5 LBS | TEMPERATURE: 97.7 F | SYSTOLIC BLOOD PRESSURE: 140 MMHG | RESPIRATION RATE: 10 BRPM | BODY MASS INDEX: 22.44 KG/M2 | HEART RATE: 63 BPM

## 2024-03-07 DIAGNOSIS — G89.18 POSTOPERATIVE PAIN: Primary | ICD-10-CM

## 2024-03-07 LAB — GLUCOSE BLD-MCNC: 99 MG/DL (ref 70–99)

## 2024-03-07 PROCEDURE — 7100000001 HC PACU RECOVERY - ADDTL 15 MIN: Performed by: UROLOGY

## 2024-03-07 PROCEDURE — 2709999900 HC NON-CHARGEABLE SUPPLY: Performed by: UROLOGY

## 2024-03-07 PROCEDURE — 2500000003 HC RX 250 WO HCPCS

## 2024-03-07 PROCEDURE — 3700000000 HC ANESTHESIA ATTENDED CARE: Performed by: UROLOGY

## 2024-03-07 PROCEDURE — 3600000014 HC SURGERY LEVEL 4 ADDTL 15MIN: Performed by: UROLOGY

## 2024-03-07 PROCEDURE — 82962 GLUCOSE BLOOD TEST: CPT

## 2024-03-07 PROCEDURE — 2580000003 HC RX 258: Performed by: UROLOGY

## 2024-03-07 PROCEDURE — 2580000003 HC RX 258: Performed by: ANESTHESIOLOGY

## 2024-03-07 PROCEDURE — 7100000011 HC PHASE II RECOVERY - ADDTL 15 MIN: Performed by: UROLOGY

## 2024-03-07 PROCEDURE — 94640 AIRWAY INHALATION TREATMENT: CPT

## 2024-03-07 PROCEDURE — 3600000004 HC SURGERY LEVEL 4 BASE: Performed by: UROLOGY

## 2024-03-07 PROCEDURE — 2720000010 HC SURG SUPPLY STERILE: Performed by: UROLOGY

## 2024-03-07 PROCEDURE — 6360000002 HC RX W HCPCS

## 2024-03-07 PROCEDURE — 6360000002 HC RX W HCPCS: Performed by: ANESTHESIOLOGY

## 2024-03-07 PROCEDURE — 3700000001 HC ADD 15 MINUTES (ANESTHESIA): Performed by: UROLOGY

## 2024-03-07 PROCEDURE — 7100000010 HC PHASE II RECOVERY - FIRST 15 MIN: Performed by: UROLOGY

## 2024-03-07 PROCEDURE — 7100000000 HC PACU RECOVERY - FIRST 15 MIN: Performed by: UROLOGY

## 2024-03-07 PROCEDURE — 6360000002 HC RX W HCPCS: Performed by: UROLOGY

## 2024-03-07 RX ORDER — ONDANSETRON 2 MG/ML
4 INJECTION INTRAMUSCULAR; INTRAVENOUS
Status: DISCONTINUED | OUTPATIENT
Start: 2024-03-07 | End: 2024-03-07 | Stop reason: HOSPADM

## 2024-03-07 RX ORDER — SUCCINYLCHOLINE/SOD CL,ISO/PF 100 MG/5ML
SYRINGE (ML) INTRAVENOUS PRN
Status: DISCONTINUED | OUTPATIENT
Start: 2024-03-07 | End: 2024-03-07 | Stop reason: SDUPTHER

## 2024-03-07 RX ORDER — LIDOCAINE HYDROCHLORIDE 20 MG/ML
INJECTION, SOLUTION INTRAVENOUS PRN
Status: DISCONTINUED | OUTPATIENT
Start: 2024-03-07 | End: 2024-03-07 | Stop reason: SDUPTHER

## 2024-03-07 RX ORDER — SODIUM CHLORIDE 0.9 % (FLUSH) 0.9 %
5-40 SYRINGE (ML) INJECTION EVERY 12 HOURS SCHEDULED
Status: DISCONTINUED | OUTPATIENT
Start: 2024-03-07 | End: 2024-03-07 | Stop reason: HOSPADM

## 2024-03-07 RX ORDER — SODIUM CHLORIDE 9 MG/ML
INJECTION, SOLUTION INTRAVENOUS PRN
Status: DISCONTINUED | OUTPATIENT
Start: 2024-03-07 | End: 2024-03-07 | Stop reason: HOSPADM

## 2024-03-07 RX ORDER — SODIUM CHLORIDE 0.9 % (FLUSH) 0.9 %
5-40 SYRINGE (ML) INJECTION PRN
Status: DISCONTINUED | OUTPATIENT
Start: 2024-03-07 | End: 2024-03-07 | Stop reason: HOSPADM

## 2024-03-07 RX ORDER — ROCURONIUM BROMIDE 10 MG/ML
INJECTION, SOLUTION INTRAVENOUS PRN
Status: DISCONTINUED | OUTPATIENT
Start: 2024-03-07 | End: 2024-03-07 | Stop reason: SDUPTHER

## 2024-03-07 RX ORDER — OXYCODONE HYDROCHLORIDE AND ACETAMINOPHEN 5; 325 MG/1; MG/1
1 TABLET ORAL EVERY 8 HOURS PRN
Qty: 9 TABLET | Refills: 0 | Status: SHIPPED | OUTPATIENT
Start: 2024-03-07 | End: 2024-03-10

## 2024-03-07 RX ORDER — DEXAMETHASONE SODIUM PHOSPHATE 4 MG/ML
INJECTION, SOLUTION INTRA-ARTICULAR; INTRALESIONAL; INTRAMUSCULAR; INTRAVENOUS; SOFT TISSUE PRN
Status: DISCONTINUED | OUTPATIENT
Start: 2024-03-07 | End: 2024-03-07 | Stop reason: SDUPTHER

## 2024-03-07 RX ORDER — ONDANSETRON 2 MG/ML
INJECTION INTRAMUSCULAR; INTRAVENOUS PRN
Status: DISCONTINUED | OUTPATIENT
Start: 2024-03-07 | End: 2024-03-07 | Stop reason: SDUPTHER

## 2024-03-07 RX ORDER — MEPERIDINE HYDROCHLORIDE 25 MG/ML
12.5 INJECTION INTRAMUSCULAR; INTRAVENOUS; SUBCUTANEOUS EVERY 5 MIN PRN
Status: DISCONTINUED | OUTPATIENT
Start: 2024-03-07 | End: 2024-03-07 | Stop reason: HOSPADM

## 2024-03-07 RX ORDER — DROPERIDOL 2.5 MG/ML
0.62 INJECTION, SOLUTION INTRAMUSCULAR; INTRAVENOUS
Status: DISCONTINUED | OUTPATIENT
Start: 2024-03-07 | End: 2024-03-07 | Stop reason: HOSPADM

## 2024-03-07 RX ORDER — NALOXONE HYDROCHLORIDE 0.4 MG/ML
INJECTION, SOLUTION INTRAMUSCULAR; INTRAVENOUS; SUBCUTANEOUS PRN
Status: DISCONTINUED | OUTPATIENT
Start: 2024-03-07 | End: 2024-03-07 | Stop reason: HOSPADM

## 2024-03-07 RX ORDER — FENTANYL CITRATE 50 UG/ML
INJECTION, SOLUTION INTRAMUSCULAR; INTRAVENOUS PRN
Status: DISCONTINUED | OUTPATIENT
Start: 2024-03-07 | End: 2024-03-07 | Stop reason: SDUPTHER

## 2024-03-07 RX ORDER — OXYCODONE HYDROCHLORIDE 5 MG/1
5 TABLET ORAL
Status: DISCONTINUED | OUTPATIENT
Start: 2024-03-07 | End: 2024-03-07 | Stop reason: HOSPADM

## 2024-03-07 RX ORDER — FENTANYL CITRATE 50 UG/ML
25 INJECTION, SOLUTION INTRAMUSCULAR; INTRAVENOUS EVERY 5 MIN PRN
Status: DISCONTINUED | OUTPATIENT
Start: 2024-03-07 | End: 2024-03-07 | Stop reason: HOSPADM

## 2024-03-07 RX ORDER — SODIUM CHLORIDE 9 MG/ML
INJECTION, SOLUTION INTRAVENOUS CONTINUOUS PRN
Status: COMPLETED | OUTPATIENT
Start: 2024-03-07 | End: 2024-03-07

## 2024-03-07 RX ORDER — PROPOFOL 10 MG/ML
INJECTION, EMULSION INTRAVENOUS PRN
Status: DISCONTINUED | OUTPATIENT
Start: 2024-03-07 | End: 2024-03-07 | Stop reason: SDUPTHER

## 2024-03-07 RX ADMIN — ROCURONIUM BROMIDE 50 MG: 10 INJECTION, SOLUTION INTRAVENOUS at 11:45

## 2024-03-07 RX ADMIN — Medication 140 MG: at 11:37

## 2024-03-07 RX ADMIN — SUGAMMADEX 200 MG: 100 INJECTION, SOLUTION INTRAVENOUS at 12:22

## 2024-03-07 RX ADMIN — DEXAMETHASONE SODIUM PHOSPHATE 10 MG: 4 INJECTION, SOLUTION INTRA-ARTICULAR; INTRALESIONAL; INTRAMUSCULAR; INTRAVENOUS; SOFT TISSUE at 11:43

## 2024-03-07 RX ADMIN — HYDROMORPHONE HYDROCHLORIDE 0.5 MG: 1 INJECTION, SOLUTION INTRAMUSCULAR; INTRAVENOUS; SUBCUTANEOUS at 13:07

## 2024-03-07 RX ADMIN — LIDOCAINE HYDROCHLORIDE 100 MG: 20 INJECTION, SOLUTION INTRAVENOUS at 11:36

## 2024-03-07 RX ADMIN — ONDANSETRON 4 MG: 2 INJECTION INTRAMUSCULAR; INTRAVENOUS at 12:17

## 2024-03-07 RX ADMIN — CEFAZOLIN 2000 MG: 2 INJECTION, POWDER, FOR SOLUTION INTRAMUSCULAR; INTRAVENOUS at 11:41

## 2024-03-07 RX ADMIN — Medication 10 ML: at 13:10

## 2024-03-07 RX ADMIN — PROPOFOL 30 MG: 10 INJECTION, EMULSION INTRAVENOUS at 11:37

## 2024-03-07 RX ADMIN — FENTANYL CITRATE 50 MCG: 50 INJECTION, SOLUTION INTRAMUSCULAR; INTRAVENOUS at 12:27

## 2024-03-07 RX ADMIN — PROPOFOL 120 MG: 10 INJECTION, EMULSION INTRAVENOUS at 11:36

## 2024-03-07 RX ADMIN — SODIUM CHLORIDE: 9 INJECTION, SOLUTION INTRAVENOUS at 11:31

## 2024-03-07 RX ADMIN — FENTANYL CITRATE 50 MCG: 50 INJECTION, SOLUTION INTRAMUSCULAR; INTRAVENOUS at 11:34

## 2024-03-07 ASSESSMENT — PAIN DESCRIPTION - PAIN TYPE
TYPE: ACUTE PAIN

## 2024-03-07 ASSESSMENT — PAIN - FUNCTIONAL ASSESSMENT
PAIN_FUNCTIONAL_ASSESSMENT: ACTIVITIES ARE NOT PREVENTED
PAIN_FUNCTIONAL_ASSESSMENT: 0-10
PAIN_FUNCTIONAL_ASSESSMENT: NONE - DENIES PAIN
PAIN_FUNCTIONAL_ASSESSMENT: ACTIVITIES ARE NOT PREVENTED

## 2024-03-07 ASSESSMENT — PAIN DESCRIPTION - LOCATION
LOCATION: PENIS

## 2024-03-07 ASSESSMENT — PAIN DESCRIPTION - FREQUENCY
FREQUENCY: CONTINUOUS

## 2024-03-07 ASSESSMENT — PAIN SCALES - GENERAL
PAINLEVEL_OUTOF10: 0
PAINLEVEL_OUTOF10: 4
PAINLEVEL_OUTOF10: 8
PAINLEVEL_OUTOF10: 5
PAINLEVEL_OUTOF10: 4

## 2024-03-07 ASSESSMENT — PAIN DESCRIPTION - DESCRIPTORS
DESCRIPTORS: BURNING

## 2024-03-07 ASSESSMENT — PAIN DESCRIPTION - ORIENTATION
ORIENTATION: MID

## 2024-03-07 ASSESSMENT — PAIN DESCRIPTION - ONSET
ONSET: GRADUAL

## 2024-03-07 ASSESSMENT — LIFESTYLE VARIABLES: SMOKING_STATUS: 0

## 2024-03-07 NOTE — ANESTHESIA PRE PROCEDURE
Department of Anesthesiology  Preprocedure Note       Name:  Eulalio Gross   Age:  73 y.o.  :  1950                                          MRN:  [id number]         Date:  3/7/2024      Surgeon: Surgeon(s):  Anatoly Russo MD    Procedure: Procedure(s):  CYSTOSCOPY, GREENLIGHT LASER OF THE PROSTATE    Medications prior to admission:   Prior to Admission medications    Medication Sig Start Date End Date Taking? Authorizing Provider   levoFLOXacin (LEVAQUIN) 500 MG tablet Take 1 tablet by mouth daily   Yes Harika Rader MD   aspirin 81 MG EC tablet Take 1 tablet by mouth daily 19   Harika Rader MD   carvedilol (COREG) 6.25 MG tablet 1 tablet 2 times daily (with meals) 23   Harika Rader MD   pravastatin (PRAVACHOL) 40 MG tablet 0.5 tablets 23   Harika Rader MD   QUEtiapine (SEROQUEL) 25 MG tablet 1 tablet 7/10/23   Harika Rader MD   loperamide (IMODIUM) 2 MG capsule 1 capsule 23   Harika Rader MD   loratadine (CLARITIN) 10 MG tablet 1 tablet 23   ProviderHarika MD   diltiazem (DILACOR XR) 240 MG extended release capsule Take 1 capsule by mouth daily    Harika Rader MD   cetirizine (ZYRTEC ALLERGY) 10 MG tablet Take 1 tablet by mouth daily 17   Ashley Fischer DO       Current medications:    Current Facility-Administered Medications   Medication Dose Route Frequency Provider Last Rate Last Admin    sodium chloride flush 0.9 % injection 5-40 mL  5-40 mL IntraVENous 2 times per day Jorge Daniels MD        sodium chloride flush 0.9 % injection 5-40 mL  5-40 mL IntraVENous PRN Jorge Daniels MD        0.9 % sodium chloride infusion   IntraVENous PRN Jorge Daniels MD        ceFAZolin (ANCEF) 2,000 mg in sodium chloride 0.9 % 50 mL IVPB (mini-bag)  2,000 mg IntraVENous On Call to OR Anatoly Russo MD           Allergies:    Allergies   Allergen Reactions    Penicillins Hives    Gemfibrozil Other (See Comments)

## 2024-03-07 NOTE — PROGRESS NOTES
Patient meets criteria for discharge from Phase II, VSS patient discharge instructions reviewed, patient verbalized understanding.  Clemente education completed, leg bag provided including additional supplies.  Patient escorted to transport via wheelchair.

## 2024-03-07 NOTE — PROGRESS NOTES
Patient arrived to PACU bay 5, report received from OR mnurse and CRNA at bedside, patient alert and oriented, follows commands, denies pain at this time. VSS, oxygen saturation greater than 95% on room air.

## 2024-03-07 NOTE — PROGRESS NOTES
Patient arrived to Pre Op bay 4, patient is alert and oriented x 4, IV placed, admission completed.  Patient in agreement with scheduled procedure, family in room with patient.    Mohs Case Number:

## 2024-03-07 NOTE — OP NOTE
Operative Note      Patient: Eulalio Gross  YOB: 1950  MRN: [id number]    Date of Procedure: 3/7/2024    Pre-Op Diagnosis Codes:     * Benign prostatic hyperplasia with lower urinary tract symptoms, symptom details unspecified [N40.1];  Cystitis, recurrent    Post-op Diagnosis: same     Procedure Performed:  Photovaporization of prostate (PVP)  - Green light laser vaporization      Surgeon:  ALDA LONDON MD  Assistant: Scrub  Anesthesia: General   Drains: 22F 3-way Clemente with 40cc of sterile water in the balloon   Specimens: none  Estimated Blood Loss: 10 mL  Complications: None      Findings:   --mild to moderately trabeculated bladder,  no evidence of tumors   -Prostatic urethra showed lateral hypertrophy,  no median lobe (estimated vol 40-50cc gland)  -Open channel at the end of the case from the bladder neck to the verumontanum   -No damage to ureteral orifices or the external sphincter      Indications:   73M with BPH, voiding dysfunction, recurrent cystitis   see office, op notes for further details. Risk, benefits, alternatives of surgery reviewed. He signed the consent prior to surgery.        Procedure:  After obtaining informed consent, the patient was brought to the operating room and placed supine on the operating room table.  After adequate anesthesia, he was then repositioned in the dorsal lithotomy position and prepped and draped in the standard surgical fashion.     I began the case by doing rigid cystoscopy with a 21 Upper sorbian sheath and a 30 degree lens.  The anterior urethra was within normal limits.  Other findings are above.        I then placed the laser sheath along with the obturator.  Once in the bladder, I exchanged the obturator for the working bridge and assembled green-light laser.    Starting at 80 shin,  I made a channel to get better water flow by opening a little bit on the left and right side and at the bladder neck.    I vaporized of his the left lateral lobe.  I  then concentrated on his right lateral lobe.  At times I worked up to 120 to 140 shin.   Finally, some apical prostate tissue was removed as well. Bleeding was minimized by performing vaporization at 35 to 50 shin.   There was minimal oozing.  Total wattage used was about 35k), to vaporize a large channel.         My resection went from his bladder neck back to the area of his verumontanum.   He had an open channel at the end of the case.  Hemostasis was excellent as there was no active bleeding.   His external sphincter and ureteral orifices were intact without injury. There was no evidence of any undermining and laser injury at the bladder neck.      I placed a 22 Faroese 3-way Chase catheter with 40 mL of sterile water into the balloon. His catheter was connected to continuous bladder irrigation with saline.  He was then awakened from anesthesia, and brought to the Post Anesthesia Care Unit in stable condition. There were no apparent complications.       Follow up:  - chase removal by the  nurse in office Tuesday morning in [facility]  - postop check in 4-5 weeks.   -already on levoflox abx,  senna/colace, narcotic prn

## 2024-03-07 NOTE — ANESTHESIA POSTPROCEDURE EVALUATION
Department of Anesthesiology  Postprocedure Note    Patient: Eulalio Gross  MRN: [id number]  YOB: 1950  Date of evaluation: 3/7/2024    Procedure Summary       Date: 03/07/24 Room / Location: [facility]    Anesthesia Start: 1131 Anesthesia Stop: 1234    Procedure: CYSTOSCOPY, GREENLIGHT LASER OF THE PROSTATE (Bladder) Diagnosis:       Benign prostatic hyperplasia with lower urinary tract symptoms, symptom details unspecified      (Benign prostatic hyperplasia with lower urinary tract symptoms, symptom details unspecified [N40.1])    Surgeons: Anatoly Russo MD Responsible Provider: Jorge Daniels MD    Anesthesia Type: general ASA Status: 3            Anesthesia Type: No value filed.    Nitish Phase I: Nitish Score: 10    Nitish Phase II: Nitish Score: 10    Anesthesia Post Evaluation    Patient location during evaluation: PACU  Patient participation: complete - patient participated  Level of consciousness: awake  Airway patency: patent  Nausea & Vomiting: no nausea and no vomiting  Cardiovascular status: blood pressure returned to baseline  Respiratory status: acceptable  Hydration status: stable  Comments: Vital signs stable  OK to discharge from Stage I post anesthesia care.  Care transferred from Anesthesiology department on discharge from perioperative area   Multimodal analgesia pain management approach  Pain management: satisfactory to patient    No notable events documented.

## 2024-03-07 NOTE — DISCHARGE INSTRUCTIONS
-Hold Aspirin or other blood thinners until urine clear yellow, light pink.    -Clemente removal in [facility] office - Tuesday morning 8am by nursing team.     -Follow up in 4-6 weeks in the [facility] urology office for postop visit.     -Expect light pink, light red urine on and off a few weeks.   If passing large jelly like clots or catheter not draining, please call or go to ATIF Russo MD  Office: [phone]      [facility]       [place] [id number]         What to Expect at Home  Your Recovery    Prostate outlet surgery to remove prostate tissue. It is done when an overgrown prostate gland is pressing on the urethra and making it hard for a man to urinate.    You may need a urinary catheter for a short time. It is a flexible plastic tube used to drain urine from your bladder when you can't urinate on your own. If it is still in place when you go home, your doctor will give you instructions on how to care for your catheter.    For several days after surgery, you may feel burning when you urinate. Your urine may be pink for 1 to 3 weeks after surgery. You also may have bladder cramps, or spasms. Your doctor may give you medicine to help control the spasms.    You may still feel like you need to urinate often in the weeks after your surgery. It often takes up to 6 weeks for this to get better. After you have healed, you may have less trouble urinating. You may have better control over starting and stopping your urine stream. And you may feel like you get more relief when you urinate.  Most men can return to work or many of their usual tasks in 1 to 3 weeks. But for about 4 weeks, try to avoid heavy lifting and strenuous activities that might put extra pressure on your bladder.    Most men still can have erections after surgery (if they were able to have them before surgery). But they may not ejaculate when they have an orgasm. Semen may go into the bladder instead of out through the penis.  This is called retrograde ejaculation. It does not hurt and is not harmful to your health.    This care sheet gives you a general idea about how long it will take for you to recover. But each person recovers at a different pace. Follow the steps below to get better as quickly as possible.    How can you care for yourself at home?  Activity    Rest when you feel tired.     Be active. Walking is a good choice.     Allow your body to heal. Don't move quickly or lift anything heavy until you are feeling better.     Ask your doctor when you can drive again.     Many people are able to return to work within 1 to 3 weeks after surgery. It depends on the type of work you do and how you feel.     Do not put anything in your rectum, such as an enema or suppository, for 4 to 6 weeks after the surgery.     You may shower and take baths when your doctor says it is okay.     Ask your doctor when it is okay for you to have sex.   Diet    You can eat your normal diet. If your stomach is upset, try bland, low-fat foods like plain rice, broiled chicken, toast, and yogurt.     If your bowel movements are not regular right after surgery, try to avoid constipation and straining. Drink plenty of water. Your doctor may suggest fiber, a stool softener, or a mild laxative.   Medicines    Your doctor will tell you if and when you can restart your medicines. He or she will also give you instructions about taking any new medicines.     If you take aspirin or some other blood thinner, be sure to talk to your doctor. He or she will tell you if and when to start taking those medicines again. Make sure that you understand exactly what your doctor wants you to do.     Be safe with medicines. Read and follow all instructions on the label.  If the doctor gave you a prescription medicine for pain, take it as prescribed.  If you are not taking a prescription pain medicine, ask your doctor if you can take an over-the-counter medicine.     Take your

## 2024-03-07 NOTE — CONSULTS
Patient:  Eulalio Gross  YOB: 1950   CSN:  [id number]    Referring Provider:  No ref. provider found   Primary Care Provider:  Nubia Goldberg DO       Urology Attending Consult Note     Reason for Consultation:  utis, bph    Chief Complaint: \"I keep getting infections\"  HPI:  Eulalio is a 73 y.o. male who presented with history of cystitis, bph, voiding issues.        Past Medical History:   Diagnosis Date    Cancer (HCC)     colon and liver    Coronary atherosclerosis 11/07/2023    Hernia     History of blood transfusion     Hypercholesteremia 03/13/2012    Hypertension     Major depressive disorder, recurrent, moderate (HCC) 11/07/2023    Malignant neoplasm of liver (HCC) 11/07/2023    Pneumonia     Ulcer        Past Surgical History:   Procedure Laterality Date    CHOLECYSTECTOMY      COLON SURGERY      colon stimulator    HERNIA REPAIR      LIVER SURGERY      SMALL INTESTINE SURGERY         Medication List reviewed:      Current Facility-Administered Medications   Medication Dose Route Frequency Provider Last Rate Last Admin    sodium chloride flush 0.9 % injection 5-40 mL  5-40 mL IntraVENous 2 times per day Jorge Daniels MD        sodium chloride flush 0.9 % injection 5-40 mL  5-40 mL IntraVENous PRN Jorge Daniels MD        0.9 % sodium chloride infusion   IntraVENous PRN Jorge Daniels MD        ceFAZolin (ANCEF) 2,000 mg in sodium chloride 0.9 % 50 mL IVPB (mini-bag)  2,000 mg IntraVENous On Call to OR Anatoly Russo MD           Allergies   Allergen Reactions    Penicillins Hives    Gemfibrozil Other (See Comments)     GI upset       Family History   Problem Relation Age of Onset    Diabetes Mother     Stroke Mother     Heart Disease Father     High Blood Pressure Father     Cancer Sister     Diabetes Sister     Pacemaker Brother     Cancer Brother     Diabetes Brother        Social History     Tobacco Use    Smoking status: Never    Smokeless tobacco: Never   Vaping Use    Vaping

## 2024-04-23 ENCOUNTER — TELEPHONE (OUTPATIENT)
Dept: SURGERY | Age: 74
End: 2024-04-23

## 2024-04-23 ENCOUNTER — OFFICE VISIT (OUTPATIENT)
Dept: SURGERY | Age: 74
End: 2024-04-23
Payer: MEDICARE

## 2024-04-23 ENCOUNTER — PREP FOR PROCEDURE (OUTPATIENT)
Dept: SURGERY | Age: 74
End: 2024-04-23

## 2024-04-23 VITALS
TEMPERATURE: 97.8 F | WEIGHT: 150 LBS | RESPIRATION RATE: 16 BRPM | HEART RATE: 71 BPM | OXYGEN SATURATION: 97 % | SYSTOLIC BLOOD PRESSURE: 148 MMHG | DIASTOLIC BLOOD PRESSURE: 80 MMHG | BODY MASS INDEX: 22.15 KG/M2

## 2024-04-23 DIAGNOSIS — R15.9 FULL INCONTINENCE OF FECES: ICD-10-CM

## 2024-04-23 DIAGNOSIS — R15.9 FULL INCONTINENCE OF FECES: Primary | ICD-10-CM

## 2024-04-23 DIAGNOSIS — Z96.82 NEUROSTIMULATOR DEVICE IN SITU: ICD-10-CM

## 2024-04-23 PROCEDURE — 99204 OFFICE O/P NEW MOD 45 MIN: CPT | Performed by: SURGERY

## 2024-04-23 PROCEDURE — 3077F SYST BP >= 140 MM HG: CPT | Performed by: SURGERY

## 2024-04-23 PROCEDURE — G8420 CALC BMI NORM PARAMETERS: HCPCS | Performed by: SURGERY

## 2024-04-23 PROCEDURE — 1036F TOBACCO NON-USER: CPT | Performed by: SURGERY

## 2024-04-23 PROCEDURE — 1123F ACP DISCUSS/DSCN MKR DOCD: CPT | Performed by: SURGERY

## 2024-04-23 PROCEDURE — 3079F DIAST BP 80-89 MM HG: CPT | Performed by: SURGERY

## 2024-04-23 PROCEDURE — G8427 DOCREV CUR MEDS BY ELIG CLIN: HCPCS | Performed by: SURGERY

## 2024-04-23 PROCEDURE — 3017F COLORECTAL CA SCREEN DOC REV: CPT | Performed by: SURGERY

## 2024-04-23 RX ORDER — SODIUM CHLORIDE 0.9 % (FLUSH) 0.9 %
5-40 SYRINGE (ML) INJECTION EVERY 12 HOURS SCHEDULED
OUTPATIENT
Start: 2024-04-23

## 2024-04-23 RX ORDER — SODIUM CHLORIDE 9 MG/ML
INJECTION, SOLUTION INTRAVENOUS PRN
OUTPATIENT
Start: 2024-04-23

## 2024-04-23 RX ORDER — SODIUM CHLORIDE 0.9 % (FLUSH) 0.9 %
5-40 SYRINGE (ML) INJECTION PRN
OUTPATIENT
Start: 2024-04-23

## 2024-04-23 NOTE — TELEPHONE ENCOUNTER
Patient has been scheduled for:    Procedure:Medtronic SNS Replacement   Date:5/3/24  Time:10:15 AM  Arrival:8:15AM  Hospital:[facility]     ASA?:Aspirin- 7 day hold  Prep?Fleets enema 2 hours before arrival time     Pre-op?N/A    Post-op Appt?     Patient advised they will need a .    Orders faxed to surgery scheduling.    Medication sent to Pharmacy:     Stents or ostomy marking?    Instructions have been mailed/emailed to:[address] [place] [address]    Added to outlook calendar

## 2024-04-23 NOTE — H&P (VIEW-ONLY)
[facility] [place] [facility] COLORECTAL SURGERY  [address] [place] [address]  [address]  [place] [address]  Dept: [phone]  Dept Fax: [phone]  Loc: [phone]    Visit Date: 4/23/2024    Eulalio Gross is a 73 y.o. male who presents today for: New Patient (Patient is here for Medtronic SNS low battery that needs replaced, he states it was placed by Dr. Morse in Dec 2019)      HPI:       Eulalio Gross is a 73 y.o. male referred to me by Dr. Morse of colorectal for further evaluation regarding low battery on sacral nerve stimulator.  Mr. Gross is accompanied by his wife today in the office.  He had a sacral nerve stimulator, Medtronic, placed 5 years ago.  He is very happy with the results but lately has been having trouble with low battery.  He would like to have the stimulator replaced with MRI compatible leads and a new battery.    He also relates to me some issues with prostate requiring laser treatment.  Denies any other recent changes in his health.    Patient's problem list, medications, past medical, surgical, family, and social histories were reviewed and updated in the chart as indicated today.    Past Medical History:   Diagnosis Date    Cancer (HCC)     colon and liver    Coronary atherosclerosis 11/07/2023    Hernia     History of blood transfusion     Hypercholesteremia 03/13/2012    Hypertension     Major depressive disorder, recurrent, moderate (HCC) 11/07/2023    Malignant neoplasm of liver (HCC) 11/07/2023    Pneumonia     Ulcer        Past Surgical History:   Procedure Laterality Date    CHOLECYSTECTOMY      COLON SURGERY      colon stimulator    HERNIA REPAIR      LIVER SURGERY      SMALL INTESTINE SURGERY      TURP N/A 3/7/2024    CYSTOSCOPY, GREENLIGHT LASER OF THE PROSTATE performed by Anatoly Russo MD at [facility] OR       Cancer-related family history includes Cancer in his brother and sister.    Social History:   Social History     Tobacco Use     Lito    Assessment/Plan:     A/P:  New undiagnosed problem(s) with uncertain prognosis: Fecal incontinence, full; dysfunction of neurostimulator  Established problem(s): Enlarged prostate  Additional workup/treatment planned: Stimulator removal and replacement  Risk of complications/morbidity: Moderate  Risk factors: Previous surgery    Current settings, programmed 3 (+0, -2), amplitude 1.9.  Battery less than 5 months.  Program 3.    Overall very happy with sacral nerve stimulator, however now needs battery replacement and wants MRI compatible leads.  Very reasonable to proceed with elective stimulator removal and replacement.  Plan for stage I and II simultaneously.  Plan for surgery in the coming weeks.  Patient understands risk of surgery and agrees to proceed.    Continue with current medications    DISPOSITION:  plan SNS replacement    My findings will be relayed to consulting practitioner or PCP via Epic    Note completed using dictation software, please excuse any errors.    Electronically signed by Bob King MD on 4/23/2024 at 9:57 AM

## 2024-04-23 NOTE — PROGRESS NOTES
Lito    Assessment/Plan:     A/P:  New undiagnosed problem(s) with uncertain prognosis: Fecal incontinence, full; dysfunction of neurostimulator  Established problem(s): Enlarged prostate  Additional workup/treatment planned: Stimulator removal and replacement  Risk of complications/morbidity: Moderate  Risk factors: Previous surgery    Current settings, programmed 3 (+0, -2), amplitude 1.9.  Battery less than 5 months.  Program 3.    Overall very happy with sacral nerve stimulator, however now needs battery replacement and wants MRI compatible leads.  Very reasonable to proceed with elective stimulator removal and replacement.  Plan for stage I and II simultaneously.  Plan for surgery in the coming weeks.  Patient understands risk of surgery and agrees to proceed.    Continue with current medications    DISPOSITION:  plan SNS replacement    My findings will be relayed to consulting practitioner or PCP via Epic    Note completed using dictation software, please excuse any errors.    Electronically signed by Bob King MD on 4/23/2024 at 9:57 AM

## 2024-04-23 NOTE — PATIENT INSTRUCTIONS
Build up slowly to 30 to 60 minutes a day on 5 or more days of the week.  Take a fiber supplement, such as Citrucel or Metamucil, every day. Read and follow all instructions on the label.    If you and your doctor feel that diarrhea is part of the problem, try to avoid:  Alcohol.  Caffeine. This is found in coffee, tea, cola drinks, and chocolate.  Nicotine, from smoking or chewing tobacco.  Gas-producing foods. These include beans, broccoli, cabbage, and apples.  Dairy products that contain lactose (milk sugar). Examples are ice cream, milk, cheese, and sour cream.  Foods and drinks high in sugar, especially fruit juice, soda, candy, and other packaged sweets (such as cookies).  Foods high in fat. These include partida, sausage, butter, oils, and anything deep-fried.  Sorbitol and xylitol. These artificial sweeteners are found in some sugarless candies and chewing gum.    Sometimes additional medications such as imodium or lomotil are used to control diarrhea, which can contribute to incontinence. Start with a low dose, and slowly increase until stools are regular and soft without the need to strain.    How can you keep yourself clean:  After bowel movements, cleanse gently with non-medicated moist baby wipes. Let air dry, and then use a zinc-based barrier cream (such as Desitin or Calmoseptine) to protect the skin. Wear loose fitting clothing. Avoid using rough toilet paper or excessive wiping.    Do pelvic floor (Kegel) exercises, which tighten and strengthen the pelvic muscles. To do Kegel exercises:  Squeeze the same muscles you would use to stop your urine. Your belly and thighs should not move.  Hold the squeeze for 3 seconds, and then relax for 3 seconds.  Start with 3 seconds. Then add 1 second each week until you are able to squeeze for 10 seconds.  Repeat the exercise 10 to 15 times a session. Do three or more sessions a day    What are the next steps if symptoms are not improved with diet changes and home

## 2024-04-25 ENCOUNTER — OFFICE VISIT (OUTPATIENT)
Dept: FAMILY MEDICINE CLINIC | Age: 74
End: 2024-04-25
Payer: MEDICARE

## 2024-04-25 VITALS
WEIGHT: 146.2 LBS | HEIGHT: 69 IN | SYSTOLIC BLOOD PRESSURE: 158 MMHG | OXYGEN SATURATION: 99 % | HEART RATE: 66 BPM | BODY MASS INDEX: 21.66 KG/M2 | DIASTOLIC BLOOD PRESSURE: 78 MMHG

## 2024-04-25 DIAGNOSIS — N30.01 ACUTE CYSTITIS WITH HEMATURIA: ICD-10-CM

## 2024-04-25 DIAGNOSIS — N30.01 ACUTE CYSTITIS WITH HEMATURIA: Primary | ICD-10-CM

## 2024-04-25 DIAGNOSIS — R53.83 FATIGUE, UNSPECIFIED TYPE: ICD-10-CM

## 2024-04-25 DIAGNOSIS — K52.9 CHRONIC DIARRHEA: ICD-10-CM

## 2024-04-25 LAB
ALBUMIN SERPL-MCNC: 4.2 G/DL (ref 3.4–5)
ALBUMIN/GLOB SERPL: 1.3 {RATIO} (ref 1.1–2.2)
ALP SERPL-CCNC: 90 U/L (ref 40–129)
ALT SERPL-CCNC: 35 U/L (ref 10–40)
ANION GAP SERPL CALCULATED.3IONS-SCNC: 13 MMOL/L (ref 3–16)
AST SERPL-CCNC: 25 U/L (ref 15–37)
BASOPHILS # BLD: 0 K/UL (ref 0–0.2)
BASOPHILS NFR BLD: 0.4 %
BILIRUB SERPL-MCNC: 1 MG/DL (ref 0–1)
BILIRUBIN, POC: NORMAL
BLOOD URINE, POC: NORMAL
BUN SERPL-MCNC: 12 MG/DL (ref 7–20)
CALCIUM SERPL-MCNC: 9.5 MG/DL (ref 8.3–10.6)
CHLORIDE SERPL-SCNC: 98 MMOL/L (ref 99–110)
CLARITY, POC: NORMAL
CO2 SERPL-SCNC: 24 MMOL/L (ref 21–32)
COLOR, POC: NORMAL
CREAT SERPL-MCNC: 0.9 MG/DL (ref 0.8–1.3)
DEPRECATED RDW RBC AUTO: 15.8 % (ref 12.4–15.4)
EOSINOPHIL # BLD: 0 K/UL (ref 0–0.6)
EOSINOPHIL NFR BLD: 0.6 %
GFR SERPLBLD CREATININE-BSD FMLA CKD-EPI: 89 ML/MIN/{1.73_M2}
GLUCOSE SERPL-MCNC: 118 MG/DL (ref 70–99)
GLUCOSE URINE, POC: NORMAL
HCT VFR BLD AUTO: 42 % (ref 40.5–52.5)
HGB BLD-MCNC: 14.1 G/DL (ref 13.5–17.5)
KETONES, POC: NORMAL
LEUKOCYTE EST, POC: NORMAL
LYMPHOCYTES # BLD: 1.9 K/UL (ref 1–5.1)
LYMPHOCYTES NFR BLD: 38.5 %
MCH RBC QN AUTO: 30.2 PG (ref 26–34)
MCHC RBC AUTO-ENTMCNC: 33.7 G/DL (ref 31–36)
MCV RBC AUTO: 89.8 FL (ref 80–100)
MONOCYTES # BLD: 0.5 K/UL (ref 0–1.3)
MONOCYTES NFR BLD: 10.2 %
NEUTROPHILS # BLD: 2.5 K/UL (ref 1.7–7.7)
NEUTROPHILS NFR BLD: 50.3 %
NITRITE, POC: NORMAL
PH, POC: 5.5
PLATELET # BLD AUTO: 165 K/UL (ref 135–450)
PMV BLD AUTO: 9.4 FL (ref 5–10.5)
POTASSIUM SERPL-SCNC: 4.1 MMOL/L (ref 3.5–5.1)
PROT SERPL-MCNC: 7.5 G/DL (ref 6.4–8.2)
PROTEIN, POC: 100
RBC # BLD AUTO: 4.67 M/UL (ref 4.2–5.9)
SODIUM SERPL-SCNC: 135 MMOL/L (ref 136–145)
SPECIFIC GRAVITY, POC: 1.03
UROBILINOGEN, POC: 1
WBC # BLD AUTO: 4.9 K/UL (ref 4–11)

## 2024-04-25 PROCEDURE — 99214 OFFICE O/P EST MOD 30 MIN: CPT | Performed by: SURGERY

## 2024-04-25 PROCEDURE — 1123F ACP DISCUSS/DSCN MKR DOCD: CPT | Performed by: SURGERY

## 2024-04-25 PROCEDURE — G2211 COMPLEX E/M VISIT ADD ON: HCPCS | Performed by: SURGERY

## 2024-04-25 PROCEDURE — G8427 DOCREV CUR MEDS BY ELIG CLIN: HCPCS | Performed by: SURGERY

## 2024-04-25 PROCEDURE — 3017F COLORECTAL CA SCREEN DOC REV: CPT | Performed by: SURGERY

## 2024-04-25 PROCEDURE — 3078F DIAST BP <80 MM HG: CPT | Performed by: SURGERY

## 2024-04-25 PROCEDURE — 81002 URINALYSIS NONAUTO W/O SCOPE: CPT | Performed by: SURGERY

## 2024-04-25 PROCEDURE — 3077F SYST BP >= 140 MM HG: CPT | Performed by: SURGERY

## 2024-04-25 PROCEDURE — G8420 CALC BMI NORM PARAMETERS: HCPCS | Performed by: SURGERY

## 2024-04-25 PROCEDURE — 1036F TOBACCO NON-USER: CPT | Performed by: SURGERY

## 2024-04-25 RX ORDER — CHOLESTYRAMINE 4 G/9G
1 POWDER, FOR SUSPENSION ORAL 2 TIMES DAILY
Qty: 90 PACKET | Refills: 3 | Status: SHIPPED | OUTPATIENT
Start: 2024-04-25

## 2024-04-25 RX ORDER — LEVOFLOXACIN 500 MG/1
500 TABLET, FILM COATED ORAL DAILY
Qty: 7 TABLET | Refills: 0 | Status: SHIPPED | OUTPATIENT
Start: 2024-04-25 | End: 2024-05-02

## 2024-04-25 NOTE — PROGRESS NOTES
4/25/2024    This is a 73 y.o. male   Chief Complaint   Patient presents with    Fever     The fever was just last nigh at 101 And fatigue, x 1.5 month   .    Fever 101 last night, feeling fatigued, no abdominal discomfort, no urinary symptoms but has noted discolored and 'chunky' urine.    Urine positive for nitrates, blood and protein with visible sediment. CBC and CMP ordered. Recent previous UTI sensitive to levofloxacin. Has f/u with urology on 5/1.          Patient Active Problem List   Diagnosis    HTN (hypertension)    PTSD (post-traumatic stress disorder)    Insomnia    Hypercholesteremia    Acute serous otitis media    Major depressive disorder, recurrent, moderate (HCC)    Malignant neoplasm of colon, unspecified (HCC)    Malignant neoplasm of liver (HCC)    Coronary atherosclerosis    Full incontinence of feces    Neurostimulator device in situ       Current Outpatient Medications   Medication Sig Dispense Refill    levoFLOXacin (LEVAQUIN) 500 MG tablet Take 1 tablet by mouth daily for 7 days 7 tablet 0    cholestyramine (QUESTRAN) 4 g packet Take 1 packet by mouth 2 times daily 90 packet 3    aspirin 81 MG EC tablet Take 1 tablet by mouth daily      carvedilol (COREG) 6.25 MG tablet 1 tablet 2 times daily (with meals)      pravastatin (PRAVACHOL) 40 MG tablet 0.5 tablets      QUEtiapine (SEROQUEL) 25 MG tablet 1 tablet      loperamide (IMODIUM) 2 MG capsule 1 capsule      loratadine (CLARITIN) 10 MG tablet 1 tablet      diltiazem (DILACOR XR) 240 MG extended release capsule Take 1 capsule by mouth daily      cetirizine (ZYRTEC ALLERGY) 10 MG tablet Take 1 tablet by mouth daily 30 tablet 5     No current facility-administered medications for this visit.       Allergies   Allergen Reactions    Penicillins Hives    Gemfibrozil Other (See Comments)     GI upset       Social History     Tobacco Use    Smoking status: Never     Passive exposure: Never    Smokeless tobacco: Never   Substance Use Topics

## 2024-04-26 RX ORDER — DIAZEPAM 10 MG/1
10 TABLET ORAL EVERY 6 HOURS PRN
COMMUNITY

## 2024-04-26 NOTE — PROGRESS NOTES
[facility] PRE-SURGICAL TESTING INSTRUCTIONS                      PRIOR TO PROCEDURE DATE:    1. PLEASE FOLLOW ANY INSTRUCTIONS GIVEN TO YOU PER YOUR SURGEON.      2. Arrange for someone to drive you home and be with you for the first 24 hours after discharge for your safety after your procedure for which you received sedation. Ensure it is someone we can share information with regarding your discharge.     NOTE: At this time ONLY 2 ADULTS may accompany you   One person ENCOURAGED to stay at hospital entire time if outpatient surgery      3. You must contact your surgeon for instructions IF:  You are taking any blood thinners, aspirin, anti-inflammatory or vitamins.  There is a change in your physical condition such as a cold, fever, rash, cuts, sores, or any other infection, especially near your surgical site.    4. Do not drink alcohol the day before or day of your procedure.  Do not use any recreational marijuana at least 24 hours or street drugs (heroin, cocaine) at minimum 5 days prior to your procedure.     5. A Pre-Surgical History and Physical MUST be completed WITHIN 30 DAYS OR LESS prior to your procedure.by your Physician or an Urgent Care        THE DAY OF YOUR PROCEDURE:  1.  Follow instructions for ARRIVAL TIME as DIRECTED BY YOUR SURGEON.     2. Enter the MAIN entrance from [facility] and follow the signs to the free Parking Garage or  Parking (offered free of charge 7 am-5pm).      3. Enter the Main Entrance of the hospital (do not enter from the lower level of the parking garage). Upon entrance, check in with the  at the surgical information desk on your LEFT.   Bring your insurance card and photo ID to register      4. DO NOT EAT ANYTHING 8 hours prior to arrival for surgery.  You may have up to 8 ounces of water 4 hours prior to your arrival for surgery.   NOTE: ALL Gastric, Bariatric & Bowel surgery patients - you MUST follow your surgeon's instructions regarding

## 2024-04-26 NOTE — PROGRESS NOTES
4/26/24 @ 1248 Charge PAT nurse Herlinda spoke with Ofelia @ Dr. King's office to confirm if Dr. King will still be doing H&P on DOS since pt went to see PCP yesterday 4/25/24 with Fever. Acute Cystitis, & Hematuria and following up with Nephrologist on 5/1/24. Per Herlinda RN, Ofelia will talk with Dr. King and return call to PAT. MD    4/29/24 @ 4284 Ofelia from Dr. King's office is aware pt is seeing nephrologist on 5/1 and confirms that pt will need a medical clearance from PCP before surgery and Ofelia is calling to notify the pt today. MD    5/2/24 @ 0426 message sent to Ofelia at surgeon office pt on schedule 5/3 G.F. [birth year] did you receive medical clearance H&P  I do not have any H&P  Luci Meza  /NR    5/2/24 @ 0408 spoke with Mariah  who transferred me to voice mail of [place] surgery scheduler for [facility]  [phone] to call me back or fax office note  Dr King office aware  /NR    5/2/24 @ 4196 reply from Ofelia I talked to the patient who claims he was given approval from PCP. I have called Dr. Goldberg office requesting a letter he is clear to have surgery. The doctor has been out of the office past couple of days.  /NR

## 2024-04-27 LAB
BACTERIA UR CULT: ABNORMAL
ORGANISM: ABNORMAL

## 2024-04-29 ENCOUNTER — TELEPHONE (OUTPATIENT)
Dept: SURGERY | Age: 74
End: 2024-04-29

## 2024-04-29 ENCOUNTER — TELEPHONE (OUTPATIENT)
Dept: FAMILY MEDICINE CLINIC | Age: 74
End: 2024-04-29

## 2024-04-29 NOTE — TELEPHONE ENCOUNTER
Patients wife is calling in to ask Dr Davila to send clearence to Dr King for her husbands surgery. She says that Dr Davila told her it was okay at his appointment last week     Phone for Dr King [phone]

## 2024-04-29 NOTE — TELEPHONE ENCOUNTER
----- Message from Bob King MD sent at 4/26/2024  3:17 PM EDT -----  Needs clearance from PCP as this surgery is completely elective.  ----- Message -----  From: Ofelia Carrion MA  Sent: 4/26/2024  12:59 PM EDT  To: Bob King MD    Received a call from [facility]. There is some concerns regarding this patient and surgery on 5/3. He was seen by PCP yesterday with fever of 101. He was treated for a UTI. There are some cardiac concerns as well. Last cardiac visit was 2/26. Can you please look at his chart and see if he is okay to proceed next Friday, 5/3? Are you okay to do H&P DOS or should be cleared by PCP?    Thanks,  Ofelia

## 2024-04-29 NOTE — TELEPHONE ENCOUNTER
Per Dr. King- \"Needs clearance from PCP as this surgery is completely elective\"       Called patient and advised need for PCP clearance.

## 2024-05-02 NOTE — TELEPHONE ENCOUNTER
Spoke with patient to confirm surgery on 5/3 and to check PCP clearance. Patient claims he was given clearance. Our office has not received anything letter yet. I called [facility] requesting letter for clearance. Message has been sent to Dr. Talamantes for clearance. Letter should appear in chart being PCP is a [facility] doctor. Patient is still planning to be at [facility] at 8:00 am.

## 2024-05-02 NOTE — TELEPHONE ENCOUNTER
Ofelia with Dr. King's office called.  Patient scheduled tomorrow for surgery and they need a clearance letter today.    Please call Ofelia if he is not cleared for surgery.

## 2024-05-02 NOTE — TELEPHONE ENCOUNTER
DIAGNOSIS:  incontinence    PROCEDURE:  sacral nerve stimulator placement  Surgeon :  Dr King  Date of surgery:  5/2/24    History Obtained From:  patient    HISTORY OF PRESENT ILLNESS:    The patient is a 73 y.o. male her for preoperative evaluation for sacral nerve stimulator.  Current problems being experienced are incontinence.   Current chronic conditions:  Cancer, HTN, HLD     Past Medical History:   Diagnosis Date    Cancer (HCC)     colon and liver    Coronary atherosclerosis 11/07/2023    Hernia     History of blood transfusion     Hypercholesteremia 03/13/2012    Hypertension     Incontinence of feces     Major depressive disorder, recurrent, moderate (HCC) 11/07/2023    Malignant neoplasm of liver (HCC) 11/07/2023    Pneumonia     Ulcer     Wears dentures     Full Upper Dentures    Wears glasses      Past Surgical History:   Procedure Laterality Date    CHOLECYSTECTOMY      COLON SURGERY      colon stimulator    HERNIA REPAIR      LIVER SURGERY      SACRAL NERVE STIMULATOR PLACEMENT      SMALL INTESTINE SURGERY      TURP N/A 03/07/2024    CYSTOSCOPY, GREENLIGHT LASER OF THE PROSTATE performed by Anatoly Russo MD at [facility] OR     Social History     Tobacco Use    Smoking status: Never     Passive exposure: Never    Smokeless tobacco: Never   Substance Use Topics    Alcohol use: No     Family History   Problem Relation Age of Onset    Diabetes Mother     Stroke Mother     Heart Disease Father     High Blood Pressure Father     Cancer Sister     Diabetes Sister     Pacemaker Brother     Cancer Brother     Diabetes Brother      Current Outpatient Medications   Medication Sig Dispense Refill    diazePAM (VALIUM) 10 MG tablet Take 1 tablet by mouth every 6 hours as needed for Anxiety. Max Daily Amount: 40 mg      levoFLOXacin (LEVAQUIN) 500 MG tablet Take 1 tablet by mouth daily for 7 days 7 tablet 0    cholestyramine (QUESTRAN) 4 g packet Take 1 packet by mouth 2 times daily 90 packet 3    aspirin 81

## 2024-05-03 ENCOUNTER — ANESTHESIA (OUTPATIENT)
Dept: OPERATING ROOM | Age: 74
End: 2024-05-03
Payer: MEDICARE

## 2024-05-03 ENCOUNTER — ANESTHESIA EVENT (OUTPATIENT)
Dept: OPERATING ROOM | Age: 74
End: 2024-05-03
Payer: MEDICARE

## 2024-05-03 ENCOUNTER — HOSPITAL ENCOUNTER (OUTPATIENT)
Age: 74
Setting detail: OUTPATIENT SURGERY
Discharge: HOME | End: 2024-05-03
Attending: SURGERY | Admitting: SURGERY
Payer: MEDICARE

## 2024-05-03 VITALS
SYSTOLIC BLOOD PRESSURE: 110 MMHG | OXYGEN SATURATION: 100 % | BODY MASS INDEX: 20.7 KG/M2 | WEIGHT: 144.6 LBS | HEIGHT: 70 IN | DIASTOLIC BLOOD PRESSURE: 68 MMHG | TEMPERATURE: 97 F | RESPIRATION RATE: 18 BRPM | HEART RATE: 56 BPM

## 2024-05-03 DIAGNOSIS — G89.18 POST-OP PAIN: ICD-10-CM

## 2024-05-03 DIAGNOSIS — N36.0 RECTOURETHRAL FISTULA: Primary | ICD-10-CM

## 2024-05-03 DIAGNOSIS — Z96.82 NEUROSTIMULATOR DEVICE IN SITU: ICD-10-CM

## 2024-05-03 DIAGNOSIS — R15.9 FULL INCONTINENCE OF FECES: ICD-10-CM

## 2024-05-03 PROCEDURE — 3700000000 HC ANESTHESIA ATTENDED CARE: Performed by: SURGERY

## 2024-05-03 PROCEDURE — 6360000002 HC RX W HCPCS: Performed by: ANESTHESIOLOGY

## 2024-05-03 PROCEDURE — 7100000011 HC PHASE II RECOVERY - ADDTL 15 MIN: Performed by: SURGERY

## 2024-05-03 PROCEDURE — 45905 DILATION OF ANAL SPHINCTER: CPT | Performed by: SURGERY

## 2024-05-03 PROCEDURE — 6360000002 HC RX W HCPCS: Performed by: SURGERY

## 2024-05-03 PROCEDURE — 64595 REV/RMV PRPH SAC/GSTR NPG/R: CPT | Performed by: SURGERY

## 2024-05-03 PROCEDURE — 7100000000 HC PACU RECOVERY - FIRST 15 MIN: Performed by: SURGERY

## 2024-05-03 PROCEDURE — 3600000002 HC SURGERY LEVEL 2 BASE: Performed by: SURGERY

## 2024-05-03 PROCEDURE — 64585 REV/RMV PERPH NSTIM ELTRD RA: CPT | Performed by: SURGERY

## 2024-05-03 PROCEDURE — 6360000002 HC RX W HCPCS

## 2024-05-03 PROCEDURE — 88300 SURGICAL PATH GROSS: CPT

## 2024-05-03 PROCEDURE — 2580000003 HC RX 258: Performed by: SURGERY

## 2024-05-03 PROCEDURE — 7100000010 HC PHASE II RECOVERY - FIRST 15 MIN: Performed by: SURGERY

## 2024-05-03 PROCEDURE — 7100000001 HC PACU RECOVERY - ADDTL 15 MIN: Performed by: SURGERY

## 2024-05-03 PROCEDURE — 3600000012 HC SURGERY LEVEL 2 ADDTL 15MIN: Performed by: SURGERY

## 2024-05-03 PROCEDURE — 3700000001 HC ADD 15 MINUTES (ANESTHESIA): Performed by: SURGERY

## 2024-05-03 PROCEDURE — 2500000003 HC RX 250 WO HCPCS: Performed by: SURGERY

## 2024-05-03 PROCEDURE — A4217 STERILE WATER/SALINE, 500 ML: HCPCS | Performed by: SURGERY

## 2024-05-03 PROCEDURE — 2580000003 HC RX 258: Performed by: ANESTHESIOLOGY

## 2024-05-03 PROCEDURE — 2709999900 HC NON-CHARGEABLE SUPPLY: Performed by: SURGERY

## 2024-05-03 PROCEDURE — 2500000003 HC RX 250 WO HCPCS

## 2024-05-03 RX ORDER — LORAZEPAM 2 MG/ML
0.5 INJECTION INTRAMUSCULAR
Status: DISCONTINUED | OUTPATIENT
Start: 2024-05-03 | End: 2024-05-03 | Stop reason: HOSPADM

## 2024-05-03 RX ORDER — SODIUM CHLORIDE 0.9 % (FLUSH) 0.9 %
5-40 SYRINGE (ML) INJECTION EVERY 12 HOURS SCHEDULED
Status: DISCONTINUED | OUTPATIENT
Start: 2024-05-03 | End: 2024-05-03 | Stop reason: HOSPADM

## 2024-05-03 RX ORDER — PROPOFOL 10 MG/ML
INJECTION, EMULSION INTRAVENOUS PRN
Status: DISCONTINUED | OUTPATIENT
Start: 2024-05-03 | End: 2024-05-03 | Stop reason: SDUPTHER

## 2024-05-03 RX ORDER — HYDROMORPHONE HYDROCHLORIDE 1 MG/ML
0.5 INJECTION, SOLUTION INTRAMUSCULAR; INTRAVENOUS; SUBCUTANEOUS EVERY 5 MIN PRN
Status: DISCONTINUED | OUTPATIENT
Start: 2024-05-03 | End: 2024-05-03 | Stop reason: HOSPADM

## 2024-05-03 RX ORDER — ONDANSETRON 2 MG/ML
4 INJECTION INTRAMUSCULAR; INTRAVENOUS
Status: DISCONTINUED | OUTPATIENT
Start: 2024-05-03 | End: 2024-05-03 | Stop reason: HOSPADM

## 2024-05-03 RX ORDER — MAGNESIUM HYDROXIDE 1200 MG/15ML
LIQUID ORAL CONTINUOUS PRN
Status: DISCONTINUED | OUTPATIENT
Start: 2024-05-03 | End: 2024-05-03 | Stop reason: HOSPADM

## 2024-05-03 RX ORDER — LIDOCAINE HYDROCHLORIDE 20 MG/ML
INJECTION, SOLUTION INTRAVENOUS PRN
Status: DISCONTINUED | OUTPATIENT
Start: 2024-05-03 | End: 2024-05-03 | Stop reason: SDUPTHER

## 2024-05-03 RX ORDER — ROCURONIUM BROMIDE 10 MG/ML
INJECTION, SOLUTION INTRAVENOUS PRN
Status: DISCONTINUED | OUTPATIENT
Start: 2024-05-03 | End: 2024-05-03 | Stop reason: SDUPTHER

## 2024-05-03 RX ORDER — MIDAZOLAM HYDROCHLORIDE 1 MG/ML
1 INJECTION INTRAMUSCULAR; INTRAVENOUS ONCE
Status: COMPLETED | OUTPATIENT
Start: 2024-05-03 | End: 2024-05-03

## 2024-05-03 RX ORDER — MEPERIDINE HYDROCHLORIDE 25 MG/ML
12.5 INJECTION INTRAMUSCULAR; INTRAVENOUS; SUBCUTANEOUS EVERY 5 MIN PRN
Status: DISCONTINUED | OUTPATIENT
Start: 2024-05-03 | End: 2024-05-03 | Stop reason: HOSPADM

## 2024-05-03 RX ORDER — FENTANYL CITRATE 50 UG/ML
25 INJECTION, SOLUTION INTRAMUSCULAR; INTRAVENOUS EVERY 5 MIN PRN
Status: DISCONTINUED | OUTPATIENT
Start: 2024-05-03 | End: 2024-05-03 | Stop reason: HOSPADM

## 2024-05-03 RX ORDER — SODIUM CHLORIDE 9 MG/ML
INJECTION, SOLUTION INTRAVENOUS PRN
Status: DISCONTINUED | OUTPATIENT
Start: 2024-05-03 | End: 2024-05-03 | Stop reason: HOSPADM

## 2024-05-03 RX ORDER — SODIUM CHLORIDE, SODIUM LACTATE, POTASSIUM CHLORIDE, CALCIUM CHLORIDE 600; 310; 30; 20 MG/100ML; MG/100ML; MG/100ML; MG/100ML
INJECTION, SOLUTION INTRAVENOUS CONTINUOUS
Status: DISCONTINUED | OUTPATIENT
Start: 2024-05-03 | End: 2024-05-03 | Stop reason: HOSPADM

## 2024-05-03 RX ORDER — SODIUM CHLORIDE 0.9 % (FLUSH) 0.9 %
5-40 SYRINGE (ML) INJECTION PRN
Status: DISCONTINUED | OUTPATIENT
Start: 2024-05-03 | End: 2024-05-03 | Stop reason: HOSPADM

## 2024-05-03 RX ORDER — DOCUSATE SODIUM 100 MG/1
100 CAPSULE, LIQUID FILLED ORAL 2 TIMES DAILY
Qty: 30 CAPSULE | Refills: 0 | Status: SHIPPED | OUTPATIENT
Start: 2024-05-03

## 2024-05-03 RX ORDER — DIPHENHYDRAMINE HYDROCHLORIDE 50 MG/ML
12.5 INJECTION INTRAMUSCULAR; INTRAVENOUS
Status: DISCONTINUED | OUTPATIENT
Start: 2024-05-03 | End: 2024-05-03 | Stop reason: HOSPADM

## 2024-05-03 RX ORDER — BUPIVACAINE HYDROCHLORIDE AND EPINEPHRINE 5; 5 MG/ML; UG/ML
INJECTION, SOLUTION EPIDURAL; INTRACAUDAL; PERINEURAL PRN
Status: DISCONTINUED | OUTPATIENT
Start: 2024-05-03 | End: 2024-05-03 | Stop reason: HOSPADM

## 2024-05-03 RX ORDER — IPRATROPIUM BROMIDE AND ALBUTEROL SULFATE 2.5; .5 MG/3ML; MG/3ML
1 SOLUTION RESPIRATORY (INHALATION)
Status: DISCONTINUED | OUTPATIENT
Start: 2024-05-03 | End: 2024-05-03 | Stop reason: HOSPADM

## 2024-05-03 RX ORDER — ONDANSETRON 2 MG/ML
INJECTION INTRAMUSCULAR; INTRAVENOUS PRN
Status: DISCONTINUED | OUTPATIENT
Start: 2024-05-03 | End: 2024-05-03 | Stop reason: SDUPTHER

## 2024-05-03 RX ORDER — DEXAMETHASONE SODIUM PHOSPHATE 4 MG/ML
INJECTION, SOLUTION INTRA-ARTICULAR; INTRALESIONAL; INTRAMUSCULAR; INTRAVENOUS; SOFT TISSUE PRN
Status: DISCONTINUED | OUTPATIENT
Start: 2024-05-03 | End: 2024-05-03 | Stop reason: SDUPTHER

## 2024-05-03 RX ORDER — GLYCOPYRROLATE 0.2 MG/ML
INJECTION INTRAMUSCULAR; INTRAVENOUS PRN
Status: DISCONTINUED | OUTPATIENT
Start: 2024-05-03 | End: 2024-05-03 | Stop reason: SDUPTHER

## 2024-05-03 RX ORDER — FENTANYL CITRATE 50 UG/ML
INJECTION, SOLUTION INTRAMUSCULAR; INTRAVENOUS PRN
Status: DISCONTINUED | OUTPATIENT
Start: 2024-05-03 | End: 2024-05-03 | Stop reason: SDUPTHER

## 2024-05-03 RX ORDER — OXYCODONE HYDROCHLORIDE 5 MG/1
5 TABLET ORAL EVERY 6 HOURS PRN
Qty: 20 TABLET | Refills: 0 | Status: SHIPPED | OUTPATIENT
Start: 2024-05-03 | End: 2024-05-08

## 2024-05-03 RX ORDER — PROCHLORPERAZINE EDISYLATE 5 MG/ML
5 INJECTION INTRAMUSCULAR; INTRAVENOUS
Status: DISCONTINUED | OUTPATIENT
Start: 2024-05-03 | End: 2024-05-03 | Stop reason: HOSPADM

## 2024-05-03 RX ORDER — NALOXONE HYDROCHLORIDE 0.4 MG/ML
INJECTION, SOLUTION INTRAMUSCULAR; INTRAVENOUS; SUBCUTANEOUS PRN
Status: DISCONTINUED | OUTPATIENT
Start: 2024-05-03 | End: 2024-05-03 | Stop reason: HOSPADM

## 2024-05-03 RX ORDER — LABETALOL HYDROCHLORIDE 5 MG/ML
10 INJECTION, SOLUTION INTRAVENOUS
Status: DISCONTINUED | OUTPATIENT
Start: 2024-05-03 | End: 2024-05-03 | Stop reason: HOSPADM

## 2024-05-03 RX ADMIN — PROPOFOL 100 MG: 10 INJECTION, EMULSION INTRAVENOUS at 10:14

## 2024-05-03 RX ADMIN — FENTANYL CITRATE 25 MCG: 50 INJECTION, SOLUTION INTRAMUSCULAR; INTRAVENOUS at 10:36

## 2024-05-03 RX ADMIN — FENTANYL CITRATE 50 MCG: 50 INJECTION, SOLUTION INTRAMUSCULAR; INTRAVENOUS at 11:04

## 2024-05-03 RX ADMIN — SUGAMMADEX 200 MG: 100 INJECTION, SOLUTION INTRAVENOUS at 11:07

## 2024-05-03 RX ADMIN — PROPOFOL 50 MG: 10 INJECTION, EMULSION INTRAVENOUS at 11:03

## 2024-05-03 RX ADMIN — MIDAZOLAM HYDROCHLORIDE 1 MG: 1 INJECTION, SOLUTION INTRAMUSCULAR; INTRAVENOUS at 09:54

## 2024-05-03 RX ADMIN — PROPOFOL 20 MG: 10 INJECTION, EMULSION INTRAVENOUS at 10:15

## 2024-05-03 RX ADMIN — ONDANSETRON 4 MG: 2 INJECTION INTRAMUSCULAR; INTRAVENOUS at 10:28

## 2024-05-03 RX ADMIN — DEXAMETHASONE SODIUM PHOSPHATE 4 MG: 4 INJECTION INTRA-ARTICULAR; INTRALESIONAL; INTRAMUSCULAR; INTRAVENOUS; SOFT TISSUE at 10:28

## 2024-05-03 RX ADMIN — LIDOCAINE HYDROCHLORIDE 50 MG: 20 INJECTION, SOLUTION INTRAVENOUS at 10:13

## 2024-05-03 RX ADMIN — ROCURONIUM BROMIDE 40 MG: 10 INJECTION, SOLUTION INTRAVENOUS at 10:16

## 2024-05-03 RX ADMIN — FENTANYL CITRATE 25 MCG: 50 INJECTION, SOLUTION INTRAMUSCULAR; INTRAVENOUS at 10:31

## 2024-05-03 RX ADMIN — GLYCOPYRROLATE 0.2 MG: 0.2 INJECTION INTRAMUSCULAR; INTRAVENOUS at 10:19

## 2024-05-03 RX ADMIN — SODIUM CHLORIDE, POTASSIUM CHLORIDE, SODIUM LACTATE AND CALCIUM CHLORIDE: 600; 310; 30; 20 INJECTION, SOLUTION INTRAVENOUS at 09:35

## 2024-05-03 RX ADMIN — WATER 2000 MG: 1 INJECTION INTRAMUSCULAR; INTRAVENOUS; SUBCUTANEOUS at 10:30

## 2024-05-03 ASSESSMENT — PAIN SCALES - GENERAL
PAINLEVEL_OUTOF10: 4
PAINLEVEL_OUTOF10: 4
PAINLEVEL_OUTOF10: 0

## 2024-05-03 ASSESSMENT — PAIN - FUNCTIONAL ASSESSMENT
PAIN_FUNCTIONAL_ASSESSMENT: ACTIVITIES ARE NOT PREVENTED
PAIN_FUNCTIONAL_ASSESSMENT: 0-10

## 2024-05-03 ASSESSMENT — PAIN DESCRIPTION - ONSET: ONSET: ON-GOING

## 2024-05-03 ASSESSMENT — PAIN DESCRIPTION - LOCATION
LOCATION: RECTUM
LOCATION: RECTUM

## 2024-05-03 ASSESSMENT — PAIN DESCRIPTION - FREQUENCY: FREQUENCY: CONTINUOUS

## 2024-05-03 ASSESSMENT — PAIN DESCRIPTION - DESCRIPTORS: DESCRIPTORS: ACHING

## 2024-05-03 ASSESSMENT — PAIN DESCRIPTION - PAIN TYPE: TYPE: SURGICAL PAIN

## 2024-05-03 NOTE — FLOWSHEET NOTE
Procedure(s):  EXAM UNDER ANESTHESIA, SIGMOIDOSCOPY, MEDTRONIC SACRAL NERVE STIMULAYOR REMOVAL  .    Admitted to PACU bed 16 from OR. Arrived on a stretcher .  Attached to PACU monitoring system. Alarms and parameters set. Report received from anesthesia personnel and OR staff.OR staff did not report skin issues that were observed while in OR.          05/03/24 1116   Vital Signs   Temp (!) 96.4 °F (35.8 °C)   Temp Source Temporal   Pulse 60   Heart Rate Source Monitor   Respirations 16   BP (!) 89/57   MAP (Calculated) 68   MAP (mmHg) 68   Pain Assessment   Pain Assessment Face, Legs, Activity, Cry, and Consolability (FLACC)   Pain Level 0   Faces, Legs, Activity, Cry, and Consolability (FLACC)   Face (F) 0   Legs (L) 0   Activity (A) 0   Cry (C) 0   Consolability (C) 0   FLACC Score  0   Opioid-Induced Sedation   POSS Score (!) 4   Oxygen Therapy   SpO2 100 %   O2 Device Nasal cannula   O2 Flow Rate (L/min) 4 L/min         No intake or output data in the 24 hours ending 05/03/24 1122

## 2024-05-03 NOTE — ANESTHESIA POSTPROCEDURE EVALUATION
Department of Anesthesiology  Postprocedure Note    Patient: Eulalio Gross  MRN: [id number]  YOB: 1950  Date of evaluation: 5/3/2024    Procedure Summary       Date: 05/03/24 Room / Location: [place] [address] / [facility]    Anesthesia Start: 1008 Anesthesia Stop: 1113    Procedures:       EXAM UNDER ANESTHESIA, SIGMOIDOSCOPY, MEDTRONIC SACRAL NERVE STIMULAYOR REMOVAL (Buttocks)      . (Anus) Diagnosis:       Full incontinence of feces      Neurostimulator device in situ      (Full incontinence of feces [R15.9])      (Neurostimulator device in situ [Z96.82])    Surgeons: Bob King MD Responsible Provider: Wander Cameron MD    Anesthesia Type: general ASA Status: 3            Anesthesia Type: No value filed.    Nitish Phase I: Nitish Score: 3    Nitish Phase II:      Anesthesia Post Evaluation    Patient location during evaluation: PACU  Patient participation: complete - patient participated  Level of consciousness: awake  Airway patency: patent  Nausea & Vomiting: no nausea and no vomiting  Cardiovascular status: blood pressure returned to baseline and hemodynamically stable  Respiratory status: acceptable  Hydration status: euvolemic  Multimodal analgesia pain management approach  Pain management: adequate    No notable events documented.

## 2024-05-03 NOTE — SIGNIFICANT EVENT
Spoke to his urologist yesterday.  Eulalio has been having recent onset urine coming from his rectum over past week.  This was not clearly communicated during his office visit with me earlier this week. Given his history of chemoradiation, ultralow anterior resection, and recent greenlight laser of the prostate, I am concerned for rectourethral fistula.    Discussed that replacement of sacral nerve stimulator would likely be very unhelpful in the situation and he will continue to have incontinence from urine.    Plan for exam under anesthesia, possible rectal biopsy, and removal of the sacral nerve stimulator.  Discussed that he will likely require additional workup such as MRI.  May need diverting colostomy, possible APR and suprapubic tube down the road.

## 2024-05-03 NOTE — OP NOTE
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  Removal of sacral nerve stimulator including lead and battery/generator  2.  Exam under anesthesia with dilation of anal sphincter     SURGEON: Bob King MD    ASSISTANT: JOCELYN, PGY-1, resident     ANESTHESIA: GET.      COMPLICATIONS: None.     EBL: Minimal    SPECIMEN: Sacral nerve stimulator and lead for gross evaluation only     FINDINGS: Thin watery fluid coming from rectum with finger sized palpable defect anteriorly consistent with rectourethral fistula.  Stimulator removed without difficulty     INDICATIONS: The patient is a 73-year-old male with an extensive history including rectal cancer status post  ultralow anterior section with coloanal anastomosis and postoperative adjuvant chemoradiation in the remote past as well as liver resection for liver metastases.  He had a sacral nerve stimulator placed about 6 or 7 years ago for fecal incontinence.  This worked well until recently the past few months he been having increasing issues with low battery and desired sacral nerve stimulator replacement.  I actually saw him in the office in the past week and consented him to proceed with sacral nerve stimulator replacement.  He then called his urologist with some complaints of urinary issues including discharge and what he thought was urine coming from the rectum.  With this additional information I spoke with his urologist and given his recent greenlight laser prostate treatment few weeks ago, there is concern for rectourethral fistula.  We then plan for exam under anesthesia, possible rectal biopsy, removal of a sacral nerve stimulator, as it is very likely that he will require MRI for further  evaluation.  I had this extensive discussion with him in the preoperative area with his family as well.  We discussed potential need for diverting colostomy and suprapubic tube, and potentially even eventual APR with permanent colostomy.     The risks, benefits, and alternatives of procedure were explained to the patient including risks of bleeding, infection, pelvic sepsis, unexpected findings, missed lesions, urinary retention, anal stenosis, and potential  sphincter damage and dysfunction, either temporary or permanent. Patient understood all of these risks and agreed to  proceed. Typical postoperative course was discussed, including pain and likely need for up to 3 weeks of recovery.     PROCEDURE DETAILS:   The patient was brought to the operating theater. The patient was placed in the prone abner-knife position after induction of anesthesia. Buttocks were taped apart carefully using tape.     The patient's perianal region was prepped and draped in usual sterile fashion using Betadine prep solution. Time-out was performed confirming the patient's identity as well as the operative site. Antibiotics were infused. SCDs were on and functioning. All safety points were followed.      Digital rectal exam and anoscopy was performed in all 4 quadrants using lighted anal retractor, noting: Large palpable anterior defect, about index finger size.  Just proximal to this he had a stenosed anal canal likely from previous coloanal anastomosis.  This was dilated to accommodate about 2 finger breath.  All of the tissue around the area felt scarred consistent with previous radiation and low anastomosis.  Flexible sigmoidoscopy attempted but due to stool was unable to be completed.  I did note thin fluid coming from the rectum consistent with possible urine.     We then reprepped and draped the skin of the lower back and sacrum.  Local anesthetic was used on the right side over his stimulator that was in place.  I incised through

## 2024-05-03 NOTE — NURSE NAVIGATOR
Patient dressed, oral temp is 97.0.  Patient discharged as ordered.  Carmelina in PACU called and notified of patient's discharge

## 2024-05-03 NOTE — PROGRESS NOTES
Temp 97.4. warming device turned off. Pt tolerating water and ice chips. On RA. VSS albeit bradycardic but was so pre op as well. BP soft but MAP greater than 65.

## 2024-05-03 NOTE — DISCHARGE INSTRUCTIONS
Discharge Instructions:    Diet:   You may resume a regular diet.    Wound Care:   Skin glue was used to cover your incision(s). It will fall off on its own in about 10 days. You may shower, but do not scrub the incision sites directly or soak (tub, pool, etc.).    Activity:   No heavy lifting greater than a milk jug until follow up.    Pain management:   Unless informed of any restrictions by your primary care physician, please use your preferred over-the-counter pain reliever as your primary pain medication. If you have pain that persists despite over-the-counter pain medications, you have been provided with a prescription for an opioid/narcotic pain reliever (Percocet). Be aware that this medication is a combination of opioid/narcotic and acetaminophen (the main ingredient in Tylenol); therefore, it will contribute to your daily limit of 4,000mg acetaminophen.   No driving or operating machinery while taking opioid/narcotic medications.     Bowel Regimen:   Opioid/Narcotic pain relievers have a common side effect of constipation; therefore, you have been provided with a prescription for a stool softener, Docusate (Colace).  These medications are intended to help prevent you from experiencing this very common side effect and also help to regulate your bowels after surgery.   If your stools become too loose and/or frequent, decrease the Colace to one pill one time each day. If your stools are still loose after this modification, stop taking this medication all together.    Return Precautions:   Call/ Return to ED for increased redness, worsening pain, drainage from wound, fevers, or any other concerns about your incision or post op course.    Follow up with Dr. King in 1-2 weeks. Please call [phone] to schedule your appointment.    Patient will need to complete outpatient Pelvis MRI.             [facility] AMBULATORY PROCEDURE DISCHARGE INSTRUCTIONS      Date: 05/03/24   Patient: Eulalio Gross,  Age:  over-the-counter medicines. Don't start any new medicines before you talk to your doctor or pharmacist.  Ask your doctor about a naloxone rescue kit.   It can help you--and even save your life--if you take too much of an opioid.    If you have a CPAP machine, it is very important that you use it daily during all periods of sleep and daytime rest during your recovery at home.  Surgery and Anesthesia place a significant amount of stress on your body.  Using your CPAP will help keep you safe and lessen the negative effects of that stress.      CALL YOUR PHYSICIAN FOR:  Watch for these possible complications, symptoms, or side effects of anesthesia.  Call physician if they or any other problems occur:    Signs of INFECTION   > Fever over 101°     > Redness, swelling, hardness or warmth at the operative site   >Foul smelling or cloudy drainage at the operative site   Unrelieved PAIN  Unrelieved NAUSEA  Blood soaked dressing.  (Some oozing may be normal)  Inability to urinate      Numb, pale, blue, cold or tingling extremity        If you smoke STOP. We care about your health!

## 2024-05-03 NOTE — FLOWSHEET NOTE
PACU Transfer to [facility]    Procedure(s):  EXAM UNDER ANESTHESIA, SIGMOIDOSCOPY, MEDTRONIC SACRAL NERVE STIMULAYOR REMOVAL  .    Pt meets criteria to transfer to next phase of care per CECY SCORE and ASPAN standards       05/03/24 1200   Vital Signs   Temp 97.2 °F (36.2 °C)   Temp Source Temporal   Pulse 50   Respirations 12   /65   MAP (Calculated) 80   MAP (mmHg) 79   Pain Assessment   Pain Assessment None - Denies Pain   Opioid-Induced Sedation   POSS Score 1   Oxygen Therapy   SpO2 100 %   O2 Device None (Room air)   O2 Flow Rate (L/min) 0 L/min           In: 960 [P.O.:360; I.V.:600]  Out: -         Pt taken to [facility] #9 via Stretcher by Dawit Gar

## 2024-05-03 NOTE — FLOWSHEET NOTE
BP improved after fluid resuscitation. Temp remains normothermic. Tolerating PO, denies pain or nausea. Will send to [facility] for d/c

## 2024-05-03 NOTE — ANESTHESIA PRE PROCEDURE
ceFAZolin (ANCEF) 2,000 mg in sterile water 20 mL IV syringe  2,000 mg IntraVENous On Call to OR Bob King MD           Allergies:    Allergies   Allergen Reactions   • Penicillins Hives   • Gemfibrozil Other (See Comments)     GI upset       Problem List:    Patient Active Problem List   Diagnosis Code   • HTN (hypertension) I10   • PTSD (post-traumatic stress disorder) F43.10   • Insomnia G47.00   • Hypercholesteremia E78.00   • Acute serous otitis media H65.00   • Major depressive disorder, recurrent, moderate (HCC) F33.1   • Malignant neoplasm of colon, unspecified (HCC) C18.9   • Malignant neoplasm of liver (HCC) C22.9   • Coronary atherosclerosis I25.10   • Full incontinence of feces R15.9   • Neurostimulator device in situ Z96.82       Past Medical History:        Diagnosis Date   • Cancer (HCC)     colon and liver   • Coronary atherosclerosis 11/07/2023   • Hernia    • History of blood transfusion    • Hypercholesteremia 03/13/2012   • Hypertension    • Incontinence of feces    • Major depressive disorder, recurrent, moderate (HCC) 11/07/2023   • Malignant neoplasm of liver (HCC) 11/07/2023   • Pneumonia    • Ulcer    • Wears dentures     Full Upper Dentures   • Wears glasses        Past Surgical History:        Procedure Laterality Date   • CHOLECYSTECTOMY     • COLON SURGERY      colon stimulator   • HERNIA REPAIR     • LIVER SURGERY     • SACRAL NERVE STIMULATOR PLACEMENT     • SMALL INTESTINE SURGERY     • TURP N/A 03/07/2024    CYSTOSCOPY, GREENLIGHT LASER OF THE PROSTATE performed by Anatoly Russo MD at [facility] OR       Social History:    Social History     Tobacco Use   • Smoking status: Never     Passive exposure: Never   • Smokeless tobacco: Never   Substance Use Topics   • Alcohol use: No                                Counseling given: Not Answered      Vital Signs (Current):   Vitals:    04/26/24 1216 05/03/24 0822   BP:  (!) 142/73   Pulse:  53   Resp:  17   Temp:  97.1 °F (36.2 °C)

## 2024-05-03 NOTE — NURSE NAVIGATOR
1309 Patient arrived in [facility] with original temporal temp of 96.3.  Temp was checked orally- 94.0 and axillary 94.0.  patient was swaddled in warm blankets, given bilat hand warmers and warm blanket placed to cover head.  Patient denies complaints .  At 1300 oral temp was 95.2   Dr Cameron was notified and advises patient be returned to [facility].  Shanthi OTT RN in charge and she called PACU charge to arrange bed assignment

## 2024-05-03 NOTE — NURSE NAVIGATOR
Patient wanted to ambulate to [facility] prior to going back to PACU, oral temp when he returned to room was 97.2., verified temp temporally, 97.0.    rectal pad clean dry and intact.  Patients incision to left posterior flank is unremarkable with surgical glue to site.  continue to monitor

## 2024-05-03 NOTE — INTERVAL H&P NOTE
Update History & Physical    The patient's History and Physical of April 23, 2024 was reviewed with the patient and I examined the patient. He was also seen 5/2 by family medicine and cleared for surgery. Patient now expressing more distress with urinary symptoms.     Plan: The risks, benefits, expected outcome, and alternative to the recommended procedure have been discussed with the patient. Patient understands and wants to proceed with the procedure.     Electronically signed by Jackie Suazo MD on 5/3/2024 at 9:09 AM

## 2024-05-12 ENCOUNTER — HOSPITAL ENCOUNTER (OUTPATIENT)
Dept: MRI IMAGING | Age: 74
Discharge: HOME | End: 2024-05-12
Attending: SURGERY
Payer: MEDICARE

## 2024-05-12 DIAGNOSIS — N36.0 RECTOURETHRAL FISTULA: ICD-10-CM

## 2024-05-12 PROCEDURE — 72197 MRI PELVIS W/O & W/DYE: CPT

## 2024-05-12 PROCEDURE — 6360000004 HC RX CONTRAST MEDICATION: Performed by: SURGERY

## 2024-05-12 PROCEDURE — A9576 INJ PROHANCE MULTIPACK: HCPCS | Performed by: SURGERY

## 2024-05-12 RX ADMIN — GADOTERIDOL 14 ML: 279.3 INJECTION, SOLUTION INTRAVENOUS at 18:20

## 2024-05-13 NOTE — RESULT ENCOUNTER NOTE
Antigen Reaction Comment   1. Budesonide Negative    2. Betamethasone-17-valerat Negative    3. Triamcinolone Negative    4. Tixocortol-21-pivalate Negative    5. Alcometasone-17,21-dipropionate Negative    6. Clobetasole-17-propionate Negative    7. Dexamethasone-21-phosphate disodium salt Negative    8. Hydrocortisone-17-butyrate Negative    9. Desoximetasone Negative    10. Betamethasone 17,21-dipropionate Negative    11. Methylprednisolone aceponate Negative    12. Corticosteroid Mix Negative    13. H-034 Hydrocortisone-21-acetate Negative         MRI confirms rectourethral fistula, unfortunately. Spoke to Hubert on phone and discussed next steps. Getting cystoscopy tomorrow. Likely need SP tube and diverting colostomy in next few wks

## 2024-05-21 ENCOUNTER — HOSPITAL ENCOUNTER (OUTPATIENT)
Dept: CT IMAGING | Age: 74
Discharge: HOME | End: 2024-05-21
Payer: MEDICARE

## 2024-05-21 VITALS
TEMPERATURE: 97.7 F | DIASTOLIC BLOOD PRESSURE: 68 MMHG | HEART RATE: 83 BPM | OXYGEN SATURATION: 98 % | RESPIRATION RATE: 16 BRPM | SYSTOLIC BLOOD PRESSURE: 158 MMHG

## 2024-05-21 DIAGNOSIS — R39.9 SYMPTOMS INVOLVING URINARY SYSTEM: ICD-10-CM

## 2024-05-21 DIAGNOSIS — N40.1 BENIGN PROSTATIC HYPERPLASIA WITH LOWER URINARY TRACT SYMPTOMS, SYMPTOM DETAILS UNSPECIFIED: ICD-10-CM

## 2024-05-21 DIAGNOSIS — R30.0 DYSURIA: ICD-10-CM

## 2024-05-21 DIAGNOSIS — N30.91 CYSTITIS, UNSPECIFIED WITH HEMATURIA: ICD-10-CM

## 2024-05-21 LAB
DEPRECATED RDW RBC AUTO: 16.5 % (ref 12.4–15.4)
HCT VFR BLD AUTO: 38.8 % (ref 40.5–52.5)
HGB BLD-MCNC: 12.8 G/DL (ref 13.5–17.5)
INR PPP: 1.11 (ref 0.85–1.15)
MCH RBC QN AUTO: 30 PG (ref 26–34)
MCHC RBC AUTO-ENTMCNC: 33.1 G/DL (ref 31–36)
MCV RBC AUTO: 90.7 FL (ref 80–100)
PLATELET # BLD AUTO: 97 K/UL (ref 135–450)
PLATELET BLD QL SMEAR: ABNORMAL
PMV BLD AUTO: 8.5 FL (ref 5–10.5)
PROTHROMBIN TIME: 14.5 SEC (ref 11.9–14.9)
RBC # BLD AUTO: 4.28 M/UL (ref 4.2–5.9)
SLIDE REVIEW: ABNORMAL
WBC # BLD AUTO: 7.6 K/UL (ref 4–11)

## 2024-05-21 PROCEDURE — 85610 PROTHROMBIN TIME: CPT

## 2024-05-21 PROCEDURE — 49406 IMAGE CATH FLUID PERI/RETRO: CPT

## 2024-05-21 PROCEDURE — 7100000010 HC PHASE II RECOVERY - FIRST 15 MIN

## 2024-05-21 PROCEDURE — 85027 COMPLETE CBC AUTOMATED: CPT

## 2024-05-21 PROCEDURE — 6360000004 HC RX CONTRAST MEDICATION: Performed by: RADIOLOGY

## 2024-05-21 PROCEDURE — 36415 COLL VENOUS BLD VENIPUNCTURE: CPT

## 2024-05-21 PROCEDURE — 99152 MOD SED SAME PHYS/QHP 5/>YRS: CPT

## 2024-05-21 PROCEDURE — 7100000011 HC PHASE II RECOVERY - ADDTL 15 MIN

## 2024-05-21 PROCEDURE — 6360000002 HC RX W HCPCS: Performed by: RADIOLOGY

## 2024-05-21 PROCEDURE — 2580000003 HC RX 258: Performed by: RADIOLOGY

## 2024-05-21 RX ORDER — ONDANSETRON 2 MG/ML
4 INJECTION INTRAMUSCULAR; INTRAVENOUS EVERY 8 HOURS PRN
Status: DISCONTINUED | OUTPATIENT
Start: 2024-05-21 | End: 2024-05-22 | Stop reason: HOSPADM

## 2024-05-21 RX ORDER — FENTANYL CITRATE 50 UG/ML
INJECTION, SOLUTION INTRAMUSCULAR; INTRAVENOUS PRN
Status: COMPLETED | OUTPATIENT
Start: 2024-05-21 | End: 2024-05-21

## 2024-05-21 RX ORDER — SODIUM CHLORIDE 0.9 % (FLUSH) 0.9 %
5-40 SYRINGE (ML) INJECTION PRN
Status: DISCONTINUED | OUTPATIENT
Start: 2024-05-21 | End: 2024-05-22 | Stop reason: HOSPADM

## 2024-05-21 RX ORDER — OXYCODONE HYDROCHLORIDE AND ACETAMINOPHEN 5; 325 MG/1; MG/1
1 TABLET ORAL EVERY 4 HOURS PRN
Status: DISCONTINUED | OUTPATIENT
Start: 2024-05-21 | End: 2024-05-22 | Stop reason: HOSPADM

## 2024-05-21 RX ORDER — ACETAMINOPHEN 325 MG/1
650 TABLET ORAL EVERY 4 HOURS PRN
Status: DISCONTINUED | OUTPATIENT
Start: 2024-05-21 | End: 2024-05-22 | Stop reason: HOSPADM

## 2024-05-21 RX ORDER — SODIUM CHLORIDE 9 MG/ML
INJECTION, SOLUTION INTRAVENOUS PRN
Status: DISCONTINUED | OUTPATIENT
Start: 2024-05-21 | End: 2024-05-22 | Stop reason: HOSPADM

## 2024-05-21 RX ORDER — SODIUM CHLORIDE 0.9 % (FLUSH) 0.9 %
5-40 SYRINGE (ML) INJECTION EVERY 12 HOURS SCHEDULED
Status: DISCONTINUED | OUTPATIENT
Start: 2024-05-21 | End: 2024-05-22 | Stop reason: HOSPADM

## 2024-05-21 RX ORDER — MIDAZOLAM HYDROCHLORIDE 1 MG/ML
INJECTION INTRAMUSCULAR; INTRAVENOUS PRN
Status: COMPLETED | OUTPATIENT
Start: 2024-05-21 | End: 2024-05-21

## 2024-05-21 RX ORDER — OXYCODONE HYDROCHLORIDE AND ACETAMINOPHEN 5; 325 MG/1; MG/1
2 TABLET ORAL EVERY 4 HOURS PRN
Status: DISCONTINUED | OUTPATIENT
Start: 2024-05-21 | End: 2024-05-22 | Stop reason: HOSPADM

## 2024-05-21 RX ADMIN — SODIUM CHLORIDE 1000 MG: 9 INJECTION, SOLUTION INTRAVENOUS at 08:51

## 2024-05-21 RX ADMIN — IOHEXOL 10 ML: 180 INJECTION INTRAVENOUS at 10:22

## 2024-05-21 RX ADMIN — MIDAZOLAM 1 MG: 1 INJECTION INTRAMUSCULAR; INTRAVENOUS at 08:50

## 2024-05-21 RX ADMIN — FENTANYL CITRATE 50 MCG: 50 INJECTION INTRAMUSCULAR; INTRAVENOUS at 08:50

## 2024-05-21 ASSESSMENT — PAIN - FUNCTIONAL ASSESSMENT: PAIN_FUNCTIONAL_ASSESSMENT: NONE - DENIES PAIN

## 2024-05-21 NOTE — PRE SEDATION
Sedation Pre-Procedure Note    Patient Name: Eulalio Gross   YOB: 1950  Room/Bed: Room/bed info not found  Medical Record Number: [id number]  Date: 5/21/2024   Time: 8:47 AM       Indication:  suprapubic catheter    Consent: I have discussed with the patient and/or the patient representative the indication, alternatives, and the possible risks and/or complications of the planned procedure and the anesthesia methods. The patient and/or patient representative appear to understand and agree to proceed.    Vital Signs:   Vitals:    05/21/24 0724   BP: 137/69   Pulse: 74   Resp: 18   Temp: 98.6 °F (37 °C)   SpO2: 96%       Past Medical History:   has a past medical history of Cancer (HCC), Coronary atherosclerosis, Hernia, History of blood transfusion, Hypercholesteremia, Hypertension, Incontinence of feces, Major depressive disorder, recurrent, moderate (HCC), Malignant neoplasm of liver (HCC), Pneumonia, Ulcer, Wears dentures, and Wears glasses.    Past Surgical History:   has a past surgical history that includes Colon surgery; Small intestine surgery; Cholecystectomy; Liver surgery; hernia repair; TURP (N/A, 03/07/2024); Sacral nerve stimulator placement; Stimulator Surgery (N/A, 5/3/2024); and Stimulator Surgery (N/A, 5/3/2024).    Medications:   Scheduled Meds:    sodium chloride flush  5-40 mL IntraVENous 2 times per day     Continuous Infusions:    sodium chloride       PRN Meds: sodium chloride flush, sodium chloride  Home Meds:   Prior to Admission medications    Medication Sig Start Date End Date Taking? Authorizing Provider   docusate sodium (COLACE) 100 MG capsule Take 1 capsule by mouth 2 times daily 5/3/24   Cortes Lazo DO   diazePAM (VALIUM) 10 MG tablet Take 1 tablet by mouth every 6 hours as needed for Anxiety.    Provider, MD Harika   cholestyramine (QUESTRAN) 4 g packet Take 1 packet by mouth 2 times daily 4/25/24   Nubia Goldberg DO   aspirin 81 MG EC tablet Take 1 tablet

## 2024-05-21 NOTE — OR NURSING
Pt arrived for image guided suprapubic catheter placement. Procedure explained including the risk and benefits of the procedure. All questions answered. Pt verbalizes understanding of the of procedure and states no more questions. Consent signed. Vital signs stable, labs, allergies, medications, and code status reviewed. No contraindications noted.     Vitals:    05/21/24 0724   BP: 137/69   Pulse: 74   Resp: 18   Temp: 98.6 °F (37 °C)   SpO2: 96%    (vital signs in table format)    Nitish Score  2 - Able to move 4 extremities voluntarily on command  2 - BP+/- 20mmHg of normal  2 - Fully awake  2 - Able to maintain oxygen saturation >92% on room air  2 - Able to breathe deeply and cough freely    Allergies  Penicillins and Gemfibrozil    Labs  Lab Results   Component Value Date    INR 1.11 05/21/2024    PROTIME 14.5 05/21/2024       Lab Results   Component Value Date    CREATININE 0.9 04/25/2024    BUN 12 04/25/2024     (L) 04/25/2024    K 4.1 04/25/2024    CL 98 (L) 04/25/2024    CO2 24 04/25/2024       Lab Results   Component Value Date    WBC 7.6 05/21/2024    HGB 12.8 (L) 05/21/2024    HCT 38.8 (L) 05/21/2024    MCV 90.7 05/21/2024    PLT 97 (L) 05/21/2024

## 2024-05-21 NOTE — DISCHARGE INSTRUCTIONS
Keep area clean and dry, sponge bath around area after 24 hours.     Dr. Martinez's office will call you to schedule follow-up appointment.

## 2024-05-25 ENCOUNTER — HOSPITAL ENCOUNTER (EMERGENCY)
Age: 74
Discharge: HOME | End: 2024-05-25
Attending: EMERGENCY MEDICINE
Payer: MEDICARE

## 2024-05-25 VITALS
BODY MASS INDEX: 20.76 KG/M2 | TEMPERATURE: 98.3 F | DIASTOLIC BLOOD PRESSURE: 58 MMHG | RESPIRATION RATE: 16 BRPM | SYSTOLIC BLOOD PRESSURE: 103 MMHG | OXYGEN SATURATION: 98 % | HEIGHT: 70 IN | HEART RATE: 75 BPM | WEIGHT: 145 LBS

## 2024-05-25 DIAGNOSIS — T83.091A OBSTRUCTION OF INDWELLING URINARY CATHETER, INITIAL ENCOUNTER (HCC): Primary | ICD-10-CM

## 2024-05-25 LAB
ANION GAP SERPL CALCULATED.3IONS-SCNC: 10 MMOL/L (ref 3–16)
BASOPHILS # BLD: 0 K/UL (ref 0–0.2)
BASOPHILS NFR BLD: 0.3 %
BUN SERPL-MCNC: 21 MG/DL (ref 7–20)
CALCIUM SERPL-MCNC: 8.8 MG/DL (ref 8.3–10.6)
CHLORIDE SERPL-SCNC: 95 MMOL/L (ref 99–110)
CO2 SERPL-SCNC: 26 MMOL/L (ref 21–32)
CREAT SERPL-MCNC: 1 MG/DL (ref 0.8–1.3)
DEPRECATED RDW RBC AUTO: 16.3 % (ref 12.4–15.4)
EOSINOPHIL # BLD: 0 K/UL (ref 0–0.6)
EOSINOPHIL NFR BLD: 0.4 %
GFR SERPLBLD CREATININE-BSD FMLA CKD-EPI: 79 ML/MIN/{1.73_M2}
GLUCOSE SERPL-MCNC: 109 MG/DL (ref 70–99)
HCT VFR BLD AUTO: 35.9 % (ref 40.5–52.5)
HGB BLD-MCNC: 11.9 G/DL (ref 13.5–17.5)
LYMPHOCYTES # BLD: 0.9 K/UL (ref 1–5.1)
LYMPHOCYTES NFR BLD: 17.6 %
MAGNESIUM SERPL-MCNC: 1.8 MG/DL (ref 1.8–2.4)
MCH RBC QN AUTO: 29.6 PG (ref 26–34)
MCHC RBC AUTO-ENTMCNC: 33 G/DL (ref 31–36)
MCV RBC AUTO: 89.7 FL (ref 80–100)
MONOCYTES # BLD: 0.4 K/UL (ref 0–1.3)
MONOCYTES NFR BLD: 7.9 %
NEUTROPHILS # BLD: 3.9 K/UL (ref 1.7–7.7)
NEUTROPHILS NFR BLD: 73.8 %
PLATELET # BLD AUTO: 104 K/UL (ref 135–450)
PMV BLD AUTO: 8.4 FL (ref 5–10.5)
POTASSIUM SERPL-SCNC: 3.4 MMOL/L (ref 3.5–5.1)
RBC # BLD AUTO: 4.01 M/UL (ref 4.2–5.9)
SODIUM SERPL-SCNC: 131 MMOL/L (ref 136–145)
WBC # BLD AUTO: 5.2 K/UL (ref 4–11)

## 2024-05-25 PROCEDURE — 83735 ASSAY OF MAGNESIUM: CPT

## 2024-05-25 PROCEDURE — 99284 EMERGENCY DEPT VISIT MOD MDM: CPT

## 2024-05-25 PROCEDURE — 80048 BASIC METABOLIC PNL TOTAL CA: CPT

## 2024-05-25 PROCEDURE — 36415 COLL VENOUS BLD VENIPUNCTURE: CPT

## 2024-05-25 PROCEDURE — 85025 COMPLETE CBC W/AUTO DIFF WBC: CPT

## 2024-05-25 PROCEDURE — 2580000003 HC RX 258: Performed by: EMERGENCY MEDICINE

## 2024-05-25 PROCEDURE — 6370000000 HC RX 637 (ALT 250 FOR IP): Performed by: EMERGENCY MEDICINE

## 2024-05-25 PROCEDURE — 96374 THER/PROPH/DIAG INJ IV PUSH: CPT

## 2024-05-25 PROCEDURE — 51798 US URINE CAPACITY MEASURE: CPT

## 2024-05-25 RX ORDER — POTASSIUM CHLORIDE 20 MEQ/1
20 TABLET, EXTENDED RELEASE ORAL ONCE
Status: COMPLETED | OUTPATIENT
Start: 2024-05-25 | End: 2024-05-25

## 2024-05-25 RX ORDER — OXYCODONE HYDROCHLORIDE 5 MG/1
5 TABLET ORAL ONCE
Status: COMPLETED | OUTPATIENT
Start: 2024-05-25 | End: 2024-05-25

## 2024-05-25 RX ORDER — 0.9 % SODIUM CHLORIDE 0.9 %
1000 INTRAVENOUS SOLUTION INTRAVENOUS ONCE
Status: COMPLETED | OUTPATIENT
Start: 2024-05-25 | End: 2024-05-25

## 2024-05-25 RX ADMIN — SODIUM CHLORIDE 1000 ML: 9 INJECTION, SOLUTION INTRAVENOUS at 10:28

## 2024-05-25 RX ADMIN — POTASSIUM CHLORIDE 20 MEQ: 1500 TABLET, EXTENDED RELEASE ORAL at 10:27

## 2024-05-25 RX ADMIN — OXYCODONE 5 MG: 5 TABLET ORAL at 08:39

## 2024-05-25 ASSESSMENT — PAIN - FUNCTIONAL ASSESSMENT: PAIN_FUNCTIONAL_ASSESSMENT: 0-10

## 2024-05-25 ASSESSMENT — PAIN SCALES - GENERAL
PAINLEVEL_OUTOF10: 5
PAINLEVEL_OUTOF10: 6
PAINLEVEL_OUTOF10: 6

## 2024-05-25 ASSESSMENT — LIFESTYLE VARIABLES
HOW MANY STANDARD DRINKS CONTAINING ALCOHOL DO YOU HAVE ON A TYPICAL DAY: PATIENT DOES NOT DRINK
HOW OFTEN DO YOU HAVE A DRINK CONTAINING ALCOHOL: NEVER

## 2024-05-25 ASSESSMENT — PAIN DESCRIPTION - LOCATION: LOCATION: PENIS

## 2024-05-25 NOTE — ED PROVIDER NOTES
[facility] ED    CHIEF COMPLAINT  Urinary Catheter (Has a suprapubic catheter and a chase and neither one are draining)       HISTORY OF PRESENT ILLNESS  Eulalio Gross is a 73 y.o. male with history of colon cancer complicated by fecal incontinence, rectourethral fistula who presents to the ED with decreased drainage from his urinary catheters.  He has both a suprapubic and a Chase catheter.  He is not having output from his urinary catheters and has increased pain in the region of his bladder.    He had a fever on Thursday and was started on antibiotics by his urologist.  He is on 2 different antibiotics.  He is not sure what the names are.  Fever has improved.    I have reviewed the following from the nursing documentation:    Past Medical History:   Diagnosis Date    Cancer (HCC)     colon and liver    Coronary atherosclerosis 11/07/2023    Hernia     History of blood transfusion     Hypercholesteremia 03/13/2012    Hypertension     Incontinence of feces     Major depressive disorder, recurrent, moderate (HCC) 11/07/2023    Malignant neoplasm of liver (HCC) 11/07/2023    Pneumonia     Ulcer     Wears dentures     Full Upper Dentures    Wears glasses      Past Surgical History:   Procedure Laterality Date    CHOLECYSTECTOMY      COLON SURGERY      colon stimulator    HERNIA REPAIR      IR US ABSCESS FLUID DRAIN PERIT / RETROPERIT  5/21/2024    IR US ABSCESS FLUID DRAIN PERIT / RETROPERIT 5/21/2024 [facility] CT SCAN    LIVER SURGERY      SACRAL NERVE STIMULATOR PLACEMENT      SMALL INTESTINE SURGERY      STIMULATOR SURGERY N/A 5/3/2024    EXAM UNDER ANESTHESIA, SIGMOIDOSCOPY, MEDTRONIC SACRAL NERVE STIMULAYOR REMOVAL performed by Bob King MD at [facility] OR    STIMULATOR SURGERY N/A 5/3/2024    . performed by Bob King MD at [facility] OR    TURP N/A 03/07/2024    CYSTOSCOPY, GREENLIGHT LASER OF THE PROSTATE performed by Anatoly Russo MD at [facility] OR     Family History   Problem Relation Age of Onset

## 2024-05-25 NOTE — ED NOTES
Clemente Cath flushed with 60ml of sterile water. 200ml of urine and fecal debris returned. New leg bag applied and urine returned. Suprapubic cath flushed with 10ml of saline; New bag applied and urine now draining in new bag. Dr. Aguila notified.

## 2024-05-25 NOTE — ED NOTES
0856 - Perfect Serve sent to [facility] for Urology consult    0900 - Dr. Rafaela aguirre and spoke with dr. Aguila to complete the consult

## 2024-05-25 NOTE — DISCHARGE INSTRUCTIONS
Follow-up with your urologist.  Drink plenty of fluids.  Continue your antibiotics as prescribed by your urologist

## 2024-05-28 ENCOUNTER — OFFICE VISIT (OUTPATIENT)
Dept: SURGERY | Age: 74
End: 2024-05-28
Payer: MEDICARE

## 2024-05-28 DIAGNOSIS — N36.0 RECTOURETHRAL FISTULA: Primary | ICD-10-CM

## 2024-05-28 PROCEDURE — 3017F COLORECTAL CA SCREEN DOC REV: CPT | Performed by: SURGERY

## 2024-05-28 PROCEDURE — G8420 CALC BMI NORM PARAMETERS: HCPCS | Performed by: SURGERY

## 2024-05-28 PROCEDURE — 1123F ACP DISCUSS/DSCN MKR DOCD: CPT | Performed by: SURGERY

## 2024-05-28 PROCEDURE — 99215 OFFICE O/P EST HI 40 MIN: CPT | Performed by: SURGERY

## 2024-05-28 PROCEDURE — G8427 DOCREV CUR MEDS BY ELIG CLIN: HCPCS | Performed by: SURGERY

## 2024-05-28 PROCEDURE — 1036F TOBACCO NON-USER: CPT | Performed by: SURGERY

## 2024-05-28 RX ORDER — ENOXAPARIN SODIUM 100 MG/ML
40 INJECTION SUBCUTANEOUS ONCE
Status: CANCELLED | OUTPATIENT
Start: 2024-05-28 | End: 2024-05-28

## 2024-05-28 RX ORDER — SODIUM CHLORIDE 0.9 % (FLUSH) 0.9 %
5-40 SYRINGE (ML) INJECTION EVERY 12 HOURS SCHEDULED
Status: CANCELLED | OUTPATIENT
Start: 2024-05-28

## 2024-05-28 RX ORDER — ACETAMINOPHEN 325 MG/1
1000 TABLET ORAL ONCE
Status: CANCELLED | OUTPATIENT
Start: 2024-05-28 | End: 2024-05-28

## 2024-05-28 RX ORDER — SODIUM CHLORIDE 0.9 % (FLUSH) 0.9 %
5-40 SYRINGE (ML) INJECTION PRN
Status: CANCELLED | OUTPATIENT
Start: 2024-05-28

## 2024-05-28 RX ORDER — SODIUM CHLORIDE 9 MG/ML
INJECTION, SOLUTION INTRAVENOUS PRN
Status: CANCELLED | OUTPATIENT
Start: 2024-05-28

## 2024-05-29 ENCOUNTER — TELEPHONE (OUTPATIENT)
Dept: SURGERY | Age: 74
End: 2024-05-29

## 2024-05-29 RX ORDER — LEVOFLOXACIN 500 MG/1
500 TABLET, FILM COATED ORAL DAILY
Status: ON HOLD | COMMUNITY
Start: 2024-05-15 | End: 2024-06-08 | Stop reason: HOSPADM

## 2024-05-29 RX ORDER — LOPERAMIDE HYDROCHLORIDE 2 MG/1
2 CAPSULE ORAL 4 TIMES DAILY PRN
COMMUNITY
Start: 2024-05-06

## 2024-05-29 RX ORDER — METRONIDAZOLE 500 MG/1
500 TABLET ORAL
Status: ON HOLD | COMMUNITY
Start: 2024-05-23 | End: 2024-06-08 | Stop reason: HOSPADM

## 2024-05-29 RX ORDER — AMOXICILLIN AND CLAVULANATE POTASSIUM 875; 125 MG/1; MG/1
1 TABLET, FILM COATED ORAL 2 TIMES DAILY
Status: ON HOLD | COMMUNITY
Start: 2024-05-23 | End: 2024-06-08 | Stop reason: HOSPADM

## 2024-05-29 NOTE — H&P (VIEW-ONLY)
[facility] [place] [facility] COLORECTAL SURGERY  [address] [place] [address]  [address]  [place] [address]  Dept: [phone]  Dept Fax: [phone]  Loc: [phone]    Visit Date: 5/28/2024    Eulalio Gross is a 73 y.o. male who presents today for: Post-Op Check (Fecal incontinence / SNS placement)      HPI:       Eulalio Gross is a 73 y.o. male here for follow-up after recent cystoscopy and MRI consistent with rectourethral fistula.    Unfortunately Eulalio has developed urine per rectum and feculence per bladder.  He recently underwent Clemente catheter and suprapubic tube placement with urology.  He has been having complications with the catheter becoming obstructed requiring flushing in the ER over the weekend.    Is here to consider diverting colostomy.  Of note he has had numerous previous abdominal surgeries including LAR with coloanal anastomosis and diverting ileostomy reversal, as well as hernia repair.  He also notes history of open liver resection.    Past Medical History:   Diagnosis Date    Cancer (HCC)     colon and liver    Coronary atherosclerosis 11/07/2023    Hernia     History of blood transfusion     Hypercholesteremia 03/13/2012    Hypertension     Incontinence of feces     Major depressive disorder, recurrent, moderate (HCC) 11/07/2023    Malignant neoplasm of liver (HCC) 11/07/2023    Pneumonia     Ulcer     Wears dentures     Full Upper Dentures    Wears glasses      Past Surgical History:   Procedure Laterality Date    CHOLECYSTECTOMY      COLON SURGERY      colon stimulator    HERNIA REPAIR      IR US ABSCESS FLUID DRAIN PERIT / RETROPERIT  5/21/2024    IR US ABSCESS FLUID DRAIN PERIT / RETROPERIT [place] [facility] [place]    LIVER SURGERY      SACRAL NERVE STIMULATOR PLACEMENT      SMALL INTESTINE SURGERY      STIMULATOR SURGERY N/A 5/3/2024    EXAM UNDER ANESTHESIA, SIGMOIDOSCOPY, MEDTRONIC SACRAL NERVE STIMULAYOR REMOVAL performed by Bob King MD  Stroke Mother     Heart Disease Father     High Blood Pressure Father     Cancer Sister     Diabetes Sister     Pacemaker Brother     Cancer Brother     Diabetes Brother        Social History:   Social History     Tobacco Use    Smoking status: Never     Passive exposure: Never    Smokeless tobacco: Never   Substance Use Topics    Alcohol use: No      Tobacco cessation counseling provided as appropriate.    REVIEW OF SYSTEMS:    Pertinent positives and negatives are mentioned in the HPI. Otherwise, all other systems were reviewed and negative.    Objective:     Physical Exam   There were no vitals taken for this visit.  Constitutional: Appears well-developed and well-nourished. Grooming appropriate. No gross deformities. There is no height or weight on file to calculate BMI.  Eyes: No scleral icterus. Conjunctiva/lids normal. Vision intact grossly. Pupils equal/symmetric, reactive bilaterally.  ENT: External ears/nose without defect, scars, or masses. Hearing grossly intact. No facial deformity. Lips normal, normal dentition.  Neck: No masses. Trachea midline. No crepitus. Thyroid not enlarged.  Cardiovascular: Normal rate.  No peripheral edema. Abdominal aorta normal size to palpation.  Pulmonary/Chest: Effort normal. No respiratory distress. No wheezes. No use of accessory muscles.  Musculoskeletal: Normal range of motion of head/neck, without deformity, pain, or crepitus, with normal strength and tone. Normal gait. Nails without clubbing or cyanosis.   Neurological: Alert and oriented to person, place, and time. No gross deficits. Sensation intact.  Skin: Skin is dry. No rashes noted. No pallor. No induration of nodules.  Psychiatric: Normal mood and affect. Behavior normal. Oriented to person, place, and time. Judgment and insight reasonable.    Abdominal/wound: Numerous previous abdominal scars noted    Labs reviewed: None     Imaging reviewed: MRI pelvis reviewed    Case discussed with Dr. Milton of

## 2024-05-29 NOTE — TELEPHONE ENCOUNTER
Patient has been scheduled for:    Procedure:Laparoscopic versus open colostomy creation   Date: 6/7/24   Time: 9:15 AM  Arrival:7:00 AM  Hospital:[facility]     ASA?: hold 7 days   Prep? Prep #2 (without antibiotics)     Pre-op? PCP    Post-op Appt? Cecilio 6/25/24 at 1:30 PM    Patient advised they will need a .    Orders faxed to surgery scheduling.    Medication sent to Pharmacy:     Stents or ostomy marking? Taylor emailed on 5/29/24     Instructions have been mailed/emailed to: [email or web address]    Added to outlook calendar

## 2024-05-29 NOTE — PROGRESS NOTES
Stroke Mother     Heart Disease Father     High Blood Pressure Father     Cancer Sister     Diabetes Sister     Pacemaker Brother     Cancer Brother     Diabetes Brother        Social History:   Social History     Tobacco Use    Smoking status: Never     Passive exposure: Never    Smokeless tobacco: Never   Substance Use Topics    Alcohol use: No      Tobacco cessation counseling provided as appropriate.    REVIEW OF SYSTEMS:    Pertinent positives and negatives are mentioned in the HPI. Otherwise, all other systems were reviewed and negative.    Objective:     Physical Exam   There were no vitals taken for this visit.  Constitutional: Appears well-developed and well-nourished. Grooming appropriate. No gross deformities. There is no height or weight on file to calculate BMI.  Eyes: No scleral icterus. Conjunctiva/lids normal. Vision intact grossly. Pupils equal/symmetric, reactive bilaterally.  ENT: External ears/nose without defect, scars, or masses. Hearing grossly intact. No facial deformity. Lips normal, normal dentition.  Neck: No masses. Trachea midline. No crepitus. Thyroid not enlarged.  Cardiovascular: Normal rate.  No peripheral edema. Abdominal aorta normal size to palpation.  Pulmonary/Chest: Effort normal. No respiratory distress. No wheezes. No use of accessory muscles.  Musculoskeletal: Normal range of motion of head/neck, without deformity, pain, or crepitus, with normal strength and tone. Normal gait. Nails without clubbing or cyanosis.   Neurological: Alert and oriented to person, place, and time. No gross deficits. Sensation intact.  Skin: Skin is dry. No rashes noted. No pallor. No induration of nodules.  Psychiatric: Normal mood and affect. Behavior normal. Oriented to person, place, and time. Judgment and insight reasonable.    Abdominal/wound: Numerous previous abdominal scars noted    Labs reviewed: None     Imaging reviewed: MRI pelvis reviewed    Case discussed with Dr. Milton of

## 2024-05-31 ENCOUNTER — HOSPITAL ENCOUNTER (OUTPATIENT)
Dept: CT IMAGING | Age: 74
Discharge: HOME | End: 2024-05-31
Payer: MEDICARE

## 2024-05-31 ENCOUNTER — HOSPITAL ENCOUNTER (OUTPATIENT)
Age: 74
Discharge: HOME | End: 2024-05-31
Payer: MEDICARE

## 2024-05-31 VITALS
DIASTOLIC BLOOD PRESSURE: 57 MMHG | SYSTOLIC BLOOD PRESSURE: 121 MMHG | TEMPERATURE: 97.2 F | HEART RATE: 60 BPM | RESPIRATION RATE: 16 BRPM | OXYGEN SATURATION: 98 %

## 2024-05-31 DIAGNOSIS — N40.1 BENIGN PROSTATIC HYPERPLASIA WITH LOWER URINARY TRACT SYMPTOMS, SYMPTOM DETAILS UNSPECIFIED: ICD-10-CM

## 2024-05-31 DIAGNOSIS — N40.0 BENIGN PROSTATIC HYPERPLASIA, UNSPECIFIED WHETHER LOWER URINARY TRACT SYMPTOMS PRESENT: ICD-10-CM

## 2024-05-31 LAB
ABO + RH BLD: NORMAL
ANION GAP SERPL CALCULATED.3IONS-SCNC: 7 MMOL/L (ref 3–16)
ANTIBODY SCREEN: NORMAL
BUN SERPL-MCNC: 6 MG/DL (ref 7–20)
CALCIUM SERPL-MCNC: 8.5 MG/DL (ref 8.3–10.6)
CHLORIDE SERPL-SCNC: 102 MMOL/L (ref 99–110)
CO2 SERPL-SCNC: 28 MMOL/L (ref 21–32)
CREAT SERPL-MCNC: 0.8 MG/DL (ref 0.8–1.3)
DEPRECATED RDW RBC AUTO: 16.3 % (ref 12.4–15.4)
GFR SERPLBLD CREATININE-BSD FMLA CKD-EPI: >90 ML/MIN/{1.73_M2}
GLUCOSE SERPL-MCNC: 113 MG/DL (ref 70–99)
HCT VFR BLD AUTO: 37.4 % (ref 40.5–52.5)
HGB BLD-MCNC: 12.6 G/DL (ref 13.5–17.5)
INR PPP: 1.21 (ref 0.85–1.15)
MAGNESIUM SERPL-MCNC: 2.1 MG/DL (ref 1.8–2.4)
MCH RBC QN AUTO: 29.9 PG (ref 26–34)
MCHC RBC AUTO-ENTMCNC: 33.7 G/DL (ref 31–36)
MCV RBC AUTO: 88.6 FL (ref 80–100)
PLATELET # BLD AUTO: 323 K/UL (ref 135–450)
PMV BLD AUTO: 6.9 FL (ref 5–10.5)
POTASSIUM SERPL-SCNC: 3.4 MMOL/L (ref 3.5–5.1)
PROTHROMBIN TIME: 15.5 SEC (ref 11.9–14.9)
RBC # BLD AUTO: 4.22 M/UL (ref 4.2–5.9)
SODIUM SERPL-SCNC: 137 MMOL/L (ref 136–145)
WBC # BLD AUTO: 6.8 K/UL (ref 4–11)

## 2024-05-31 PROCEDURE — 80048 BASIC METABOLIC PNL TOTAL CA: CPT

## 2024-05-31 PROCEDURE — 6360000002 HC RX W HCPCS: Performed by: SURGERY

## 2024-05-31 PROCEDURE — 83735 ASSAY OF MAGNESIUM: CPT

## 2024-05-31 PROCEDURE — 75984 XRAY CONTROL CATHETER CHANGE: CPT

## 2024-05-31 PROCEDURE — 85610 PROTHROMBIN TIME: CPT

## 2024-05-31 PROCEDURE — 6360000004 HC RX CONTRAST MEDICATION: Performed by: STUDENT IN AN ORGANIZED HEALTH CARE EDUCATION/TRAINING PROGRAM

## 2024-05-31 PROCEDURE — 85027 COMPLETE CBC AUTOMATED: CPT

## 2024-05-31 PROCEDURE — 86901 BLOOD TYPING SEROLOGIC RH(D): CPT

## 2024-05-31 PROCEDURE — 51705 CHANGE OF BLADDER TUBE: CPT

## 2024-05-31 PROCEDURE — 86900 BLOOD TYPING SEROLOGIC ABO: CPT

## 2024-05-31 PROCEDURE — 6360000002 HC RX W HCPCS: Performed by: STUDENT IN AN ORGANIZED HEALTH CARE EDUCATION/TRAINING PROGRAM

## 2024-05-31 PROCEDURE — 86850 RBC ANTIBODY SCREEN: CPT

## 2024-05-31 PROCEDURE — 36415 COLL VENOUS BLD VENIPUNCTURE: CPT

## 2024-05-31 RX ORDER — OXYCODONE HYDROCHLORIDE AND ACETAMINOPHEN 5; 325 MG/1; MG/1
1 TABLET ORAL EVERY 4 HOURS PRN
Status: DISCONTINUED | OUTPATIENT
Start: 2024-05-31 | End: 2024-06-01 | Stop reason: HOSPADM

## 2024-05-31 RX ORDER — ACETAMINOPHEN 325 MG/1
650 TABLET ORAL EVERY 4 HOURS PRN
Status: DISCONTINUED | OUTPATIENT
Start: 2024-05-31 | End: 2024-06-01 | Stop reason: HOSPADM

## 2024-05-31 RX ORDER — MIDAZOLAM HYDROCHLORIDE 1 MG/ML
INJECTION INTRAMUSCULAR; INTRAVENOUS PRN
Status: COMPLETED | OUTPATIENT
Start: 2024-05-31 | End: 2024-05-31

## 2024-05-31 RX ORDER — SODIUM CHLORIDE 0.9 % (FLUSH) 0.9 %
5-40 SYRINGE (ML) INJECTION EVERY 12 HOURS SCHEDULED
Status: DISCONTINUED | OUTPATIENT
Start: 2024-05-31 | End: 2024-06-01 | Stop reason: HOSPADM

## 2024-05-31 RX ORDER — SODIUM CHLORIDE 0.9 % (FLUSH) 0.9 %
5-40 SYRINGE (ML) INJECTION PRN
Status: DISCONTINUED | OUTPATIENT
Start: 2024-05-31 | End: 2024-05-31 | Stop reason: SDUPTHER

## 2024-05-31 RX ORDER — ACETAMINOPHEN 500 MG
1000 TABLET ORAL ONCE
Status: DISCONTINUED | OUTPATIENT
Start: 2024-05-31 | End: 2024-06-01 | Stop reason: HOSPADM

## 2024-05-31 RX ORDER — SODIUM CHLORIDE 9 MG/ML
INJECTION, SOLUTION INTRAVENOUS PRN
Status: DISCONTINUED | OUTPATIENT
Start: 2024-05-31 | End: 2024-06-01 | Stop reason: HOSPADM

## 2024-05-31 RX ORDER — METRONIDAZOLE 500 MG/100ML
500 INJECTION, SOLUTION INTRAVENOUS
Status: DISCONTINUED | OUTPATIENT
Start: 2024-05-31 | End: 2024-05-31 | Stop reason: HOSPADM

## 2024-05-31 RX ORDER — FENTANYL CITRATE 50 UG/ML
INJECTION, SOLUTION INTRAMUSCULAR; INTRAVENOUS PRN
Status: COMPLETED | OUTPATIENT
Start: 2024-05-31 | End: 2024-05-31

## 2024-05-31 RX ORDER — SODIUM CHLORIDE 9 MG/ML
INJECTION, SOLUTION INTRAVENOUS PRN
Status: DISCONTINUED | OUTPATIENT
Start: 2024-05-31 | End: 2024-05-31 | Stop reason: SDUPTHER

## 2024-05-31 RX ORDER — OXYCODONE HYDROCHLORIDE AND ACETAMINOPHEN 5; 325 MG/1; MG/1
2 TABLET ORAL EVERY 4 HOURS PRN
Status: DISCONTINUED | OUTPATIENT
Start: 2024-05-31 | End: 2024-06-01 | Stop reason: HOSPADM

## 2024-05-31 RX ORDER — IOPAMIDOL 408 MG/ML
INJECTION, SOLUTION INTRATHECAL PRN
Status: COMPLETED | OUTPATIENT
Start: 2024-05-31 | End: 2024-05-31

## 2024-05-31 RX ORDER — SODIUM CHLORIDE 0.9 % (FLUSH) 0.9 %
5-40 SYRINGE (ML) INJECTION EVERY 12 HOURS SCHEDULED
Status: DISCONTINUED | OUTPATIENT
Start: 2024-05-31 | End: 2024-05-31 | Stop reason: SDUPTHER

## 2024-05-31 RX ORDER — ENOXAPARIN SODIUM 100 MG/ML
40 INJECTION SUBCUTANEOUS ONCE
Status: COMPLETED | OUTPATIENT
Start: 2024-05-31 | End: 2024-05-31

## 2024-05-31 RX ORDER — ONDANSETRON 2 MG/ML
4 INJECTION INTRAMUSCULAR; INTRAVENOUS EVERY 8 HOURS PRN
Status: DISCONTINUED | OUTPATIENT
Start: 2024-05-31 | End: 2024-06-01 | Stop reason: HOSPADM

## 2024-05-31 RX ORDER — SODIUM CHLORIDE 0.9 % (FLUSH) 0.9 %
5-40 SYRINGE (ML) INJECTION PRN
Status: DISCONTINUED | OUTPATIENT
Start: 2024-05-31 | End: 2024-06-01 | Stop reason: HOSPADM

## 2024-05-31 RX ADMIN — ENOXAPARIN SODIUM 40 MG: 100 INJECTION SUBCUTANEOUS at 08:50

## 2024-05-31 RX ADMIN — IOPAMIDOL 40 ML: 408 INJECTION, SOLUTION INTRATHECAL at 10:00

## 2024-05-31 RX ADMIN — FENTANYL CITRATE 25 MCG: 50 INJECTION INTRAMUSCULAR; INTRAVENOUS at 10:01

## 2024-05-31 RX ADMIN — MIDAZOLAM 0.5 MG: 1 INJECTION INTRAMUSCULAR; INTRAVENOUS at 10:01

## 2024-05-31 RX ADMIN — MIDAZOLAM 1 MG: 1 INJECTION INTRAMUSCULAR; INTRAVENOUS at 09:58

## 2024-05-31 RX ADMIN — FENTANYL CITRATE 50 MCG: 50 INJECTION INTRAMUSCULAR; INTRAVENOUS at 09:58

## 2024-05-31 ASSESSMENT — PAIN SCALES - GENERAL: PAINLEVEL_OUTOF10: 0

## 2024-05-31 ASSESSMENT — PAIN - FUNCTIONAL ASSESSMENT: PAIN_FUNCTIONAL_ASSESSMENT: NONE - DENIES PAIN

## 2024-05-31 NOTE — PRE SEDATION
Sedation Pre-Procedure Note    Patient Name: Eulalio Gross   YOB: 1950  Room/Bed: Room/bed info not found  Medical Record Number: [id number]  Date: 5/31/2024   Time: 9:43 AM       Indication:  Suprapubic catheter exchange/upsize. 12-F catheter placed on May 21.     Consent: I have discussed with the patient and/or the patient representative the indication, alternatives, and the possible risks and/or complications of the planned procedure and the anesthesia methods. The patient and/or patient representative appear to understand and agree to proceed.    Vital Signs:   There were no vitals filed for this visit.    Past Medical History:   has a past medical history of Cancer (HCC), Coronary atherosclerosis, Diarrhea, Hernia, History of blood transfusion, Hypercholesteremia, Hypertension, Incontinence of feces, Major depressive disorder, recurrent, moderate (HCC), Malignant neoplasm of liver (HCC), Pneumonia, Rectourethral fistula, Ulcer, Wears dentures, and Wears glasses.    Past Surgical History:   has a past surgical history that includes Colon surgery; Small intestine surgery; Cholecystectomy; Liver surgery; hernia repair; TURP (N/A, 03/07/2024); Sacral nerve stimulator placement; Stimulator Surgery (N/A, 05/03/2024); Stimulator Surgery (N/A, 05/03/2024); and IR US GUIDED ABSCESS DRAINAGE PERIT/RETROPERIT/PERC (05/21/2024).    Medications:   Scheduled Meds:    sodium chloride flush  5-40 mL IntraVENous 2 times per day    acetaminophen  1,000 mg Oral Once    ceFAZolin (ANCEF) IVPB  2,000 mg IntraVENous On Call to OR    And    metroNIDAZOLE  500 mg IntraVENous On Call to OR     Continuous Infusions:    sodium chloride       PRN Meds: sodium chloride flush, sodium chloride  Home Meds:   Prior to Admission medications    Medication Sig Start Date End Date Taking? Authorizing Provider   amoxicillin-clavulanate (AUGMENTIN) 875-125 MG per tablet Take 1 tablet by mouth 2 times daily amoxicillin 875 mg-potassium

## 2024-05-31 NOTE — PROGRESS NOTES
Discharge instructions explained in detail at bedside to both pt and pts wife Jacquelin. Pt and wife received copy of discharge instructions. Pt  and wife deny having any questions about discharge.

## 2024-05-31 NOTE — PROGRESS NOTES
IV x 1 removed per discharge hemostasis achieved, dressing applied, no complications noted. Patient denies further needs. Pt transported to private car via wheel chair. Vitals per doc flow, pt wife Jacquelin assumes responsibility.

## 2024-05-31 NOTE — PROGRESS NOTES
Pt arrived to PACU from IR in stable condition. Report received from Emy ALBERTO. Phase II. Care of pt transferred at this time. Pt  placed on cont pulse ox and BP. Pt arrived to unit without any oxygen requirements. SPO2 98% on RA.

## 2024-05-31 NOTE — BRIEF OP NOTE
Brief Postoperative Note    Patient: Eulalio Gross  YOB: 1950  MRN: [id number]      Pre-operative Diagnosis: BPH, suprapubic catheter    Post-operative Diagnosis: Same    Procedure: Suprapubic catheter exchange/upsize    Anesthesia: Local and Moderate Sedation    Surgeons/Assistants: Braulio Rodgers DO    Estimated Blood Loss: less than 50     Complications: None    Infection Present At Time Of Surgery (PATOS) (choose all levels that have infection present):  - Superficial Infection (skin/subcutaneous) present as evidenced by pus along the tract    Specimens: Was Not Obtained    Findings:   Successful fluoroscopic guided suprapubic catheter exchange, upsize to 16-F.  Small amount of erythema and purulence noted along the tract prior to exchange, patient reports taking a course of antibiotics for this. Polysporin will be applied to the site post exchange.       Braulio Rodgers DO  5/31/2024, 10:04 AM  Interventional Radiology  [phone] ([phone])

## 2024-06-05 ENCOUNTER — OFFICE VISIT (OUTPATIENT)
Dept: FAMILY MEDICINE CLINIC | Age: 74
End: 2024-06-05
Payer: MEDICARE

## 2024-06-05 VITALS
WEIGHT: 145 LBS | HEIGHT: 70 IN | OXYGEN SATURATION: 98 % | DIASTOLIC BLOOD PRESSURE: 69 MMHG | BODY MASS INDEX: 20.76 KG/M2 | HEART RATE: 62 BPM | SYSTOLIC BLOOD PRESSURE: 130 MMHG

## 2024-06-05 DIAGNOSIS — Z01.818 PREOP EXAMINATION: Primary | ICD-10-CM

## 2024-06-05 PROCEDURE — 3017F COLORECTAL CA SCREEN DOC REV: CPT | Performed by: SURGERY

## 2024-06-05 PROCEDURE — G8427 DOCREV CUR MEDS BY ELIG CLIN: HCPCS | Performed by: SURGERY

## 2024-06-05 PROCEDURE — 99213 OFFICE O/P EST LOW 20 MIN: CPT | Performed by: SURGERY

## 2024-06-05 PROCEDURE — 1123F ACP DISCUSS/DSCN MKR DOCD: CPT | Performed by: SURGERY

## 2024-06-05 PROCEDURE — 3075F SYST BP GE 130 - 139MM HG: CPT | Performed by: SURGERY

## 2024-06-05 PROCEDURE — G8420 CALC BMI NORM PARAMETERS: HCPCS | Performed by: SURGERY

## 2024-06-05 PROCEDURE — 1036F TOBACCO NON-USER: CPT | Performed by: SURGERY

## 2024-06-05 PROCEDURE — 3078F DIAST BP <80 MM HG: CPT | Performed by: SURGERY

## 2024-06-05 ASSESSMENT — ENCOUNTER SYMPTOMS
NAUSEA: 0
SORE THROAT: 0
COUGH: 0
CONSTIPATION: 0
SHORTNESS OF BREATH: 0
ABDOMINAL PAIN: 0
DIARRHEA: 1

## 2024-06-05 NOTE — PROGRESS NOTES
No      REVIEW OF SYSTEMS:    Review of Systems   Constitutional:  Negative for chills and fever.   HENT:  Negative for congestion, ear pain and sore throat.    Respiratory:  Negative for cough and shortness of breath.    Cardiovascular:  Negative for chest pain, palpitations and leg swelling.   Gastrointestinal:  Positive for diarrhea. Negative for abdominal pain, constipation and nausea.   Genitourinary:  Negative for difficulty urinating and hematuria.        Suprapubic catheter in place   Neurological:  Negative for numbness and headaches.   Psychiatric/Behavioral:  Negative for sleep disturbance.        EXAM:      /69 (Site: Left Upper Arm, Position: Sitting, Cuff Size: Medium Adult)   Pulse 62   Ht 1.778 m (5' 10\")   Wt 65.8 kg (145 lb)   SpO2 98%   BMI 20.81 kg/m²     Physical Exam  Constitutional:       Appearance: Normal appearance.   HENT:      Nose: No rhinorrhea.   Eyes:      General: No scleral icterus.     Extraocular Movements: Extraocular movements intact.   Cardiovascular:      Rate and Rhythm: Normal rate and regular rhythm.      Pulses: Normal pulses.      Heart sounds: Normal heart sounds. No murmur heard.  Pulmonary:      Effort: Pulmonary effort is normal.      Breath sounds: Normal breath sounds.   Abdominal:      General: Bowel sounds are normal.      Palpations: Abdomen is soft.      Tenderness: There is no abdominal tenderness.   Musculoskeletal:         General: Normal range of motion.   Skin:     General: Skin is warm and dry.      Capillary Refill: Capillary refill takes less than 2 seconds.      Findings: No rash.   Neurological:      Mental Status: He is alert and oriented to person, place, and time.   Psychiatric:         Mood and Affect: Mood normal.           DATA:  EKG:  Date:  2/22/24    I have reviewed EKG with the following interpretation:    Impression:    Sinus rhythm with baseline artifact  Subsequent NST unrelated with NSR and     CBC:   Lab Results   Component

## 2024-06-06 ENCOUNTER — TELEPHONE (OUTPATIENT)
Dept: SURGERY | Age: 74
End: 2024-06-06

## 2024-06-06 RX ORDER — SODIUM CHLORIDE 0.9 % (FLUSH) 0.9 %
5-40 SYRINGE (ML) INJECTION PRN
Status: CANCELLED | OUTPATIENT
Start: 2024-06-07

## 2024-06-06 RX ORDER — ACETAMINOPHEN 325 MG/1
1000 TABLET ORAL ONCE
Status: CANCELLED | OUTPATIENT
Start: 2024-06-07 | End: 2024-06-06

## 2024-06-06 RX ORDER — METRONIDAZOLE 500 MG/100ML
500 INJECTION, SOLUTION INTRAVENOUS
Status: CANCELLED | OUTPATIENT
Start: 2024-06-07 | End: 2024-06-07

## 2024-06-06 RX ORDER — SODIUM CHLORIDE 9 MG/ML
INJECTION, SOLUTION INTRAVENOUS PRN
Status: CANCELLED | OUTPATIENT
Start: 2024-06-07

## 2024-06-06 RX ORDER — ENOXAPARIN SODIUM 100 MG/ML
40 INJECTION SUBCUTANEOUS ONCE
Status: CANCELLED | OUTPATIENT
Start: 2024-06-07 | End: 2024-06-06

## 2024-06-06 RX ORDER — SODIUM CHLORIDE 0.9 % (FLUSH) 0.9 %
5-40 SYRINGE (ML) INJECTION EVERY 12 HOURS SCHEDULED
Status: CANCELLED | OUTPATIENT
Start: 2024-06-07

## 2024-06-06 NOTE — TELEPHONE ENCOUNTER
Spoke with patients wife to confirm surgery on 6/7. Arrival time 7:00 am, clear liquid diet, need  18 or older.

## 2024-06-07 ENCOUNTER — ANESTHESIA EVENT (OUTPATIENT)
Dept: OPERATING ROOM | Age: 74
End: 2024-06-07
Payer: MEDICARE

## 2024-06-07 ENCOUNTER — ANESTHESIA (OUTPATIENT)
Dept: OPERATING ROOM | Age: 74
End: 2024-06-07
Payer: MEDICARE

## 2024-06-07 ENCOUNTER — HOSPITAL ENCOUNTER (INPATIENT)
Age: 74
LOS: 1 days | Discharge: HOME | End: 2024-06-08
Attending: SURGERY | Admitting: SURGERY
Payer: MEDICARE

## 2024-06-07 DIAGNOSIS — Z93.3 S/P COLOSTOMY (HCC): Primary | ICD-10-CM

## 2024-06-07 DIAGNOSIS — K43.0 INCISIONAL HERNIA, INCARCERATED: ICD-10-CM

## 2024-06-07 LAB
ABO + RH BLD: NORMAL
ANION GAP SERPL CALCULATED.3IONS-SCNC: 10 MMOL/L (ref 3–16)
BLD GP AB SCN SERPL QL: NORMAL
BUN SERPL-MCNC: 12 MG/DL (ref 7–20)
CALCIUM SERPL-MCNC: 9.3 MG/DL (ref 8.3–10.6)
CHLORIDE SERPL-SCNC: 103 MMOL/L (ref 99–110)
CO2 SERPL-SCNC: 27 MMOL/L (ref 21–32)
CREAT SERPL-MCNC: 0.8 MG/DL (ref 0.8–1.3)
DEPRECATED RDW RBC AUTO: 16.3 % (ref 12.4–15.4)
GFR SERPLBLD CREATININE-BSD FMLA CKD-EPI: >90 ML/MIN/{1.73_M2}
GLUCOSE SERPL-MCNC: 102 MG/DL (ref 70–99)
HCT VFR BLD AUTO: 39.6 % (ref 40.5–52.5)
HGB BLD-MCNC: 13.2 G/DL (ref 13.5–17.5)
MCH RBC QN AUTO: 29.5 PG (ref 26–34)
MCHC RBC AUTO-ENTMCNC: 33.4 G/DL (ref 31–36)
MCV RBC AUTO: 88.4 FL (ref 80–100)
PLATELET # BLD AUTO: 267 K/UL (ref 135–450)
PMV BLD AUTO: 7.2 FL (ref 5–10.5)
POTASSIUM SERPL-SCNC: 3.7 MMOL/L (ref 3.5–5.1)
RBC # BLD AUTO: 4.48 M/UL (ref 4.2–5.9)
SODIUM SERPL-SCNC: 140 MMOL/L (ref 136–145)
WBC # BLD AUTO: 5 K/UL (ref 4–11)

## 2024-06-07 PROCEDURE — 2580000003 HC RX 258: Performed by: STUDENT IN AN ORGANIZED HEALTH CARE EDUCATION/TRAINING PROGRAM

## 2024-06-07 PROCEDURE — 3700000000 HC ANESTHESIA ATTENDED CARE: Performed by: SURGERY

## 2024-06-07 PROCEDURE — 86901 BLOOD TYPING SEROLOGIC RH(D): CPT

## 2024-06-07 PROCEDURE — 6370000000 HC RX 637 (ALT 250 FOR IP): Performed by: SURGERY

## 2024-06-07 PROCEDURE — 2500000003 HC RX 250 WO HCPCS: Performed by: SURGERY

## 2024-06-07 PROCEDURE — 6360000002 HC RX W HCPCS: Performed by: SURGERY

## 2024-06-07 PROCEDURE — 80048 BASIC METABOLIC PNL TOTAL CA: CPT

## 2024-06-07 PROCEDURE — 3600000014 HC SURGERY LEVEL 4 ADDTL 15MIN: Performed by: SURGERY

## 2024-06-07 PROCEDURE — 85027 COMPLETE CBC AUTOMATED: CPT

## 2024-06-07 PROCEDURE — 6360000002 HC RX W HCPCS: Performed by: NURSE ANESTHETIST, CERTIFIED REGISTERED

## 2024-06-07 PROCEDURE — 2580000003 HC RX 258: Performed by: ANESTHESIOLOGY

## 2024-06-07 PROCEDURE — 86850 RBC ANTIBODY SCREEN: CPT

## 2024-06-07 PROCEDURE — 86900 BLOOD TYPING SEROLOGIC ABO: CPT

## 2024-06-07 PROCEDURE — 6360000002 HC RX W HCPCS: Performed by: ANESTHESIOLOGY

## 2024-06-07 PROCEDURE — 64488 TAP BLOCK BI INJECTION: CPT | Performed by: ANESTHESIOLOGY

## 2024-06-07 PROCEDURE — 3600000004 HC SURGERY LEVEL 4 BASE: Performed by: SURGERY

## 2024-06-07 PROCEDURE — 7100000001 HC PACU RECOVERY - ADDTL 15 MIN: Performed by: SURGERY

## 2024-06-07 PROCEDURE — 2580000003 HC RX 258: Performed by: NURSE ANESTHETIST, CERTIFIED REGISTERED

## 2024-06-07 PROCEDURE — C9290 INJ, BUPIVACAINE LIPOSOME: HCPCS | Performed by: NURSE ANESTHETIST, CERTIFIED REGISTERED

## 2024-06-07 PROCEDURE — 2709999900 HC NON-CHARGEABLE SUPPLY: Performed by: SURGERY

## 2024-06-07 PROCEDURE — 1200000000 HC SEMI PRIVATE

## 2024-06-07 PROCEDURE — 7100000000 HC PACU RECOVERY - FIRST 15 MIN: Performed by: SURGERY

## 2024-06-07 PROCEDURE — 0D1N4Z4 BYPASS SIGMOID COLON TO CUTANEOUS, PERCUTANEOUS ENDOSCOPIC APPROACH: ICD-10-PCS | Performed by: SURGERY

## 2024-06-07 PROCEDURE — 44188 LAP COLOSTOMY: CPT | Performed by: SURGERY

## 2024-06-07 PROCEDURE — 3700000001 HC ADD 15 MINUTES (ANESTHESIA): Performed by: SURGERY

## 2024-06-07 PROCEDURE — 2500000003 HC RX 250 WO HCPCS: Performed by: NURSE ANESTHETIST, CERTIFIED REGISTERED

## 2024-06-07 PROCEDURE — 2580000003 HC RX 258: Performed by: SURGERY

## 2024-06-07 PROCEDURE — 2720000010 HC SURG SUPPLY STERILE: Performed by: SURGERY

## 2024-06-07 PROCEDURE — A4217 STERILE WATER/SALINE, 500 ML: HCPCS | Performed by: SURGERY

## 2024-06-07 PROCEDURE — 49592 RPR AA HRN 1ST < 3 NCR/STRN: CPT | Performed by: SURGERY

## 2024-06-07 PROCEDURE — 6370000000 HC RX 637 (ALT 250 FOR IP): Performed by: STUDENT IN AN ORGANIZED HEALTH CARE EDUCATION/TRAINING PROGRAM

## 2024-06-07 PROCEDURE — 0WQF4ZZ REPAIR ABDOMINAL WALL, PERCUTANEOUS ENDOSCOPIC APPROACH: ICD-10-PCS | Performed by: SURGERY

## 2024-06-07 RX ORDER — CARVEDILOL 6.25 MG/1
6.25 TABLET ORAL 2 TIMES DAILY WITH MEALS
Status: DISCONTINUED | OUTPATIENT
Start: 2024-06-07 | End: 2024-06-08 | Stop reason: HOSPADM

## 2024-06-07 RX ORDER — FENTANYL CITRATE 50 UG/ML
INJECTION, SOLUTION INTRAMUSCULAR; INTRAVENOUS PRN
Status: DISCONTINUED | OUTPATIENT
Start: 2024-06-07 | End: 2024-06-07 | Stop reason: SDUPTHER

## 2024-06-07 RX ORDER — LIDOCAINE HYDROCHLORIDE 20 MG/ML
INJECTION, SOLUTION INTRAVENOUS PRN
Status: DISCONTINUED | OUTPATIENT
Start: 2024-06-07 | End: 2024-06-07

## 2024-06-07 RX ORDER — SODIUM CHLORIDE 0.9 % (FLUSH) 0.9 %
5-40 SYRINGE (ML) INJECTION EVERY 12 HOURS SCHEDULED
Status: DISCONTINUED | OUTPATIENT
Start: 2024-06-07 | End: 2024-06-07 | Stop reason: HOSPADM

## 2024-06-07 RX ORDER — OXYCODONE HYDROCHLORIDE 5 MG/1
5 TABLET ORAL EVERY 4 HOURS PRN
Status: DISCONTINUED | OUTPATIENT
Start: 2024-06-07 | End: 2024-06-08 | Stop reason: HOSPADM

## 2024-06-07 RX ORDER — SODIUM CHLORIDE 9 MG/ML
INJECTION, SOLUTION INTRAVENOUS PRN
Status: DISCONTINUED | OUTPATIENT
Start: 2024-06-07 | End: 2024-06-07 | Stop reason: HOSPADM

## 2024-06-07 RX ORDER — SODIUM CHLORIDE, SODIUM LACTATE, POTASSIUM CHLORIDE, CALCIUM CHLORIDE 600; 310; 30; 20 MG/100ML; MG/100ML; MG/100ML; MG/100ML
INJECTION, SOLUTION INTRAVENOUS CONTINUOUS PRN
Status: DISCONTINUED | OUTPATIENT
Start: 2024-06-07 | End: 2024-06-07 | Stop reason: SDUPTHER

## 2024-06-07 RX ORDER — SODIUM CHLORIDE, SODIUM LACTATE, POTASSIUM CHLORIDE, AND CALCIUM CHLORIDE .6; .31; .03; .02 G/100ML; G/100ML; G/100ML; G/100ML
IRRIGANT IRRIGATION PRN
Status: DISCONTINUED | OUTPATIENT
Start: 2024-06-07 | End: 2024-06-07 | Stop reason: HOSPADM

## 2024-06-07 RX ORDER — ONDANSETRON 2 MG/ML
4 INJECTION INTRAMUSCULAR; INTRAVENOUS
Status: DISCONTINUED | OUTPATIENT
Start: 2024-06-07 | End: 2024-06-07 | Stop reason: HOSPADM

## 2024-06-07 RX ORDER — ONDANSETRON 2 MG/ML
INJECTION INTRAMUSCULAR; INTRAVENOUS PRN
Status: DISCONTINUED | OUTPATIENT
Start: 2024-06-07 | End: 2024-06-07 | Stop reason: SDUPTHER

## 2024-06-07 RX ORDER — OXYCODONE HYDROCHLORIDE 5 MG/1
10 TABLET ORAL EVERY 4 HOURS PRN
Status: DISCONTINUED | OUTPATIENT
Start: 2024-06-07 | End: 2024-06-08 | Stop reason: HOSPADM

## 2024-06-07 RX ORDER — ENOXAPARIN SODIUM 100 MG/ML
40 INJECTION SUBCUTANEOUS DAILY
Status: DISCONTINUED | OUTPATIENT
Start: 2024-06-08 | End: 2024-06-08 | Stop reason: HOSPADM

## 2024-06-07 RX ORDER — BUPIVACAINE HYDROCHLORIDE 5 MG/ML
INJECTION, SOLUTION EPIDURAL; INTRACAUDAL PRN
Status: DISCONTINUED | OUTPATIENT
Start: 2024-06-07 | End: 2024-06-07 | Stop reason: SDUPTHER

## 2024-06-07 RX ORDER — SODIUM CHLORIDE, SODIUM LACTATE, POTASSIUM CHLORIDE, CALCIUM CHLORIDE 600; 310; 30; 20 MG/100ML; MG/100ML; MG/100ML; MG/100ML
INJECTION, SOLUTION INTRAVENOUS CONTINUOUS
Status: DISCONTINUED | OUTPATIENT
Start: 2024-06-07 | End: 2024-06-08

## 2024-06-07 RX ORDER — PROPOFOL 10 MG/ML
INJECTION, EMULSION INTRAVENOUS PRN
Status: DISCONTINUED | OUTPATIENT
Start: 2024-06-07 | End: 2024-06-07 | Stop reason: SDUPTHER

## 2024-06-07 RX ORDER — SODIUM CHLORIDE, SODIUM LACTATE, POTASSIUM CHLORIDE, CALCIUM CHLORIDE 600; 310; 30; 20 MG/100ML; MG/100ML; MG/100ML; MG/100ML
INJECTION, SOLUTION INTRAVENOUS CONTINUOUS
Status: DISCONTINUED | OUTPATIENT
Start: 2024-06-07 | End: 2024-06-07 | Stop reason: HOSPADM

## 2024-06-07 RX ORDER — SODIUM CHLORIDE 0.9 % (FLUSH) 0.9 %
5-40 SYRINGE (ML) INJECTION PRN
Status: DISCONTINUED | OUTPATIENT
Start: 2024-06-07 | End: 2024-06-07 | Stop reason: HOSPADM

## 2024-06-07 RX ORDER — ACETAMINOPHEN 500 MG
1000 TABLET ORAL EVERY 6 HOURS
Status: DISCONTINUED | OUTPATIENT
Start: 2024-06-07 | End: 2024-06-08 | Stop reason: HOSPADM

## 2024-06-07 RX ORDER — NALOXONE HYDROCHLORIDE 0.4 MG/ML
INJECTION, SOLUTION INTRAMUSCULAR; INTRAVENOUS; SUBCUTANEOUS PRN
Status: DISCONTINUED | OUTPATIENT
Start: 2024-06-07 | End: 2024-06-07 | Stop reason: HOSPADM

## 2024-06-07 RX ORDER — PROCHLORPERAZINE EDISYLATE 5 MG/ML
5 INJECTION INTRAMUSCULAR; INTRAVENOUS
Status: DISCONTINUED | OUTPATIENT
Start: 2024-06-07 | End: 2024-06-07 | Stop reason: HOSPADM

## 2024-06-07 RX ORDER — ONDANSETRON 4 MG/1
4 TABLET, ORALLY DISINTEGRATING ORAL EVERY 8 HOURS PRN
Status: DISCONTINUED | OUTPATIENT
Start: 2024-06-07 | End: 2024-06-08 | Stop reason: HOSPADM

## 2024-06-07 RX ORDER — QUETIAPINE FUMARATE 25 MG/1
25 TABLET, FILM COATED ORAL EVERY EVENING
Status: DISCONTINUED | OUTPATIENT
Start: 2024-06-07 | End: 2024-06-08 | Stop reason: HOSPADM

## 2024-06-07 RX ORDER — ENOXAPARIN SODIUM 100 MG/ML
40 INJECTION SUBCUTANEOUS ONCE
Status: COMPLETED | OUTPATIENT
Start: 2024-06-07 | End: 2024-06-07

## 2024-06-07 RX ORDER — FENTANYL CITRATE 50 UG/ML
50 INJECTION, SOLUTION INTRAMUSCULAR; INTRAVENOUS EVERY 5 MIN PRN
Status: DISCONTINUED | OUTPATIENT
Start: 2024-06-07 | End: 2024-06-07 | Stop reason: HOSPADM

## 2024-06-07 RX ORDER — SODIUM CHLORIDE 9 MG/ML
INJECTION, SOLUTION INTRAVENOUS PRN
Status: DISCONTINUED | OUTPATIENT
Start: 2024-06-07 | End: 2024-06-08 | Stop reason: HOSPADM

## 2024-06-07 RX ORDER — FENTANYL CITRATE 50 UG/ML
25 INJECTION, SOLUTION INTRAMUSCULAR; INTRAVENOUS EVERY 5 MIN PRN
Status: DISCONTINUED | OUTPATIENT
Start: 2024-06-07 | End: 2024-06-07 | Stop reason: HOSPADM

## 2024-06-07 RX ORDER — LABETALOL HYDROCHLORIDE 5 MG/ML
10 INJECTION, SOLUTION INTRAVENOUS
Status: DISCONTINUED | OUTPATIENT
Start: 2024-06-07 | End: 2024-06-07 | Stop reason: HOSPADM

## 2024-06-07 RX ORDER — LIDOCAINE HYDROCHLORIDE 10 MG/ML
1 INJECTION, SOLUTION EPIDURAL; INFILTRATION; INTRACAUDAL; PERINEURAL
Status: DISCONTINUED | OUTPATIENT
Start: 2024-06-07 | End: 2024-06-07 | Stop reason: HOSPADM

## 2024-06-07 RX ORDER — SODIUM CHLORIDE 0.9 % (FLUSH) 0.9 %
10 SYRINGE (ML) INJECTION PRN
Status: DISCONTINUED | OUTPATIENT
Start: 2024-06-07 | End: 2024-06-08 | Stop reason: HOSPADM

## 2024-06-07 RX ORDER — METRONIDAZOLE 500 MG/100ML
500 INJECTION, SOLUTION INTRAVENOUS
Status: COMPLETED | OUTPATIENT
Start: 2024-06-07 | End: 2024-06-07

## 2024-06-07 RX ORDER — DEXAMETHASONE SODIUM PHOSPHATE 4 MG/ML
INJECTION, SOLUTION INTRA-ARTICULAR; INTRALESIONAL; INTRAMUSCULAR; INTRAVENOUS; SOFT TISSUE PRN
Status: DISCONTINUED | OUTPATIENT
Start: 2024-06-07 | End: 2024-06-07 | Stop reason: SDUPTHER

## 2024-06-07 RX ORDER — MAGNESIUM HYDROXIDE 1200 MG/15ML
LIQUID ORAL CONTINUOUS PRN
Status: DISCONTINUED | OUTPATIENT
Start: 2024-06-07 | End: 2024-06-07 | Stop reason: HOSPADM

## 2024-06-07 RX ORDER — SUCCINYLCHOLINE/SOD CL,ISO/PF 200MG/10ML
SYRINGE (ML) INTRAVENOUS PRN
Status: DISCONTINUED | OUTPATIENT
Start: 2024-06-07 | End: 2024-06-07 | Stop reason: SDUPTHER

## 2024-06-07 RX ORDER — ONDANSETRON 2 MG/ML
4 INJECTION INTRAMUSCULAR; INTRAVENOUS EVERY 6 HOURS PRN
Status: DISCONTINUED | OUTPATIENT
Start: 2024-06-07 | End: 2024-06-08 | Stop reason: HOSPADM

## 2024-06-07 RX ORDER — HYDRALAZINE HYDROCHLORIDE 20 MG/ML
10 INJECTION INTRAMUSCULAR; INTRAVENOUS
Status: DISCONTINUED | OUTPATIENT
Start: 2024-06-07 | End: 2024-06-07 | Stop reason: HOSPADM

## 2024-06-07 RX ORDER — BUPIVACAINE HYDROCHLORIDE 5 MG/ML
INJECTION, SOLUTION EPIDURAL; INTRACAUDAL
Status: COMPLETED
Start: 2024-06-07 | End: 2024-06-07

## 2024-06-07 RX ORDER — METHOCARBAMOL 100 MG/ML
INJECTION, SOLUTION INTRAMUSCULAR; INTRAVENOUS PRN
Status: DISCONTINUED | OUTPATIENT
Start: 2024-06-07 | End: 2024-06-07 | Stop reason: SDUPTHER

## 2024-06-07 RX ORDER — ROCURONIUM BROMIDE 10 MG/ML
INJECTION, SOLUTION INTRAVENOUS PRN
Status: DISCONTINUED | OUTPATIENT
Start: 2024-06-07 | End: 2024-06-07 | Stop reason: SDUPTHER

## 2024-06-07 RX ORDER — ACETAMINOPHEN 325 MG/1
1000 TABLET ORAL ONCE
Status: COMPLETED | OUTPATIENT
Start: 2024-06-07 | End: 2024-06-07

## 2024-06-07 RX ORDER — SODIUM CHLORIDE 0.9 % (FLUSH) 0.9 %
10 SYRINGE (ML) INJECTION EVERY 12 HOURS SCHEDULED
Status: DISCONTINUED | OUTPATIENT
Start: 2024-06-07 | End: 2024-06-08 | Stop reason: HOSPADM

## 2024-06-07 RX ADMIN — METHOCARBAMOL 500 MG: 100 INJECTION, SOLUTION INTRAMUSCULAR; INTRAVENOUS at 09:04

## 2024-06-07 RX ADMIN — Medication 100 MG: at 08:39

## 2024-06-07 RX ADMIN — BUPIVACAINE HYDROCHLORIDE 30 ML: 5 INJECTION, SOLUTION EPIDURAL; INTRACAUDAL; PERINEURAL at 08:45

## 2024-06-07 RX ADMIN — ACETAMINOPHEN 1000 MG: 500 TABLET ORAL at 20:50

## 2024-06-07 RX ADMIN — SODIUM CHLORIDE, POTASSIUM CHLORIDE, SODIUM LACTATE AND CALCIUM CHLORIDE: 600; 310; 30; 20 INJECTION, SOLUTION INTRAVENOUS at 07:37

## 2024-06-07 RX ADMIN — SODIUM CHLORIDE, SODIUM LACTATE, POTASSIUM CHLORIDE, AND CALCIUM CHLORIDE: .6; .31; .03; .02 INJECTION, SOLUTION INTRAVENOUS at 08:14

## 2024-06-07 RX ADMIN — FENTANYL CITRATE 50 MCG: 50 INJECTION, SOLUTION INTRAMUSCULAR; INTRAVENOUS at 08:29

## 2024-06-07 RX ADMIN — ROCURONIUM BROMIDE 50 MG: 10 INJECTION, SOLUTION INTRAVENOUS at 08:45

## 2024-06-07 RX ADMIN — OXYCODONE HYDROCHLORIDE 10 MG: 5 TABLET ORAL at 14:52

## 2024-06-07 RX ADMIN — ROCURONIUM BROMIDE 30 MG: 10 INJECTION, SOLUTION INTRAVENOUS at 09:50

## 2024-06-07 RX ADMIN — PROPOFOL 50 MG: 10 INJECTION, EMULSION INTRAVENOUS at 08:38

## 2024-06-07 RX ADMIN — ACETAMINOPHEN 975 MG: 325 TABLET ORAL at 07:38

## 2024-06-07 RX ADMIN — DILTIAZEM HYDROCHLORIDE 60 MG: 30 TABLET, FILM COATED ORAL at 23:39

## 2024-06-07 RX ADMIN — FENTANYL CITRATE 100 MCG: 50 INJECTION, SOLUTION INTRAMUSCULAR; INTRAVENOUS at 10:40

## 2024-06-07 RX ADMIN — PROPOFOL 100 MG: 10 INJECTION, EMULSION INTRAVENOUS at 08:37

## 2024-06-07 RX ADMIN — BUPIVACAINE 20 ML: 13.3 INJECTION, SUSPENSION, LIPOSOMAL INFILTRATION at 08:45

## 2024-06-07 RX ADMIN — ENOXAPARIN SODIUM 40 MG: 100 INJECTION SUBCUTANEOUS at 07:38

## 2024-06-07 RX ADMIN — ACETAMINOPHEN 1000 MG: 500 TABLET ORAL at 14:01

## 2024-06-07 RX ADMIN — SUGAMMADEX 200 MG: 100 INJECTION, SOLUTION INTRAVENOUS at 10:38

## 2024-06-07 RX ADMIN — DEXAMETHASONE SODIUM PHOSPHATE 4 MG: 4 INJECTION INTRA-ARTICULAR; INTRALESIONAL; INTRAMUSCULAR; INTRAVENOUS; SOFT TISSUE at 08:45

## 2024-06-07 RX ADMIN — FENTANYL CITRATE 50 MCG: 50 INJECTION, SOLUTION INTRAMUSCULAR; INTRAVENOUS at 08:33

## 2024-06-07 RX ADMIN — FENTANYL CITRATE 50 MCG: 50 INJECTION INTRAMUSCULAR; INTRAVENOUS at 11:49

## 2024-06-07 RX ADMIN — QUETIAPINE FUMARATE 25 MG: 25 TABLET ORAL at 21:18

## 2024-06-07 RX ADMIN — METRONIDAZOLE 500 MG: 500 INJECTION, SOLUTION INTRAVENOUS at 08:50

## 2024-06-07 RX ADMIN — DILTIAZEM HYDROCHLORIDE 60 MG: 30 TABLET, FILM COATED ORAL at 17:10

## 2024-06-07 RX ADMIN — SODIUM CHLORIDE, SODIUM LACTATE, POTASSIUM CHLORIDE, AND CALCIUM CHLORIDE: .6; .31; .03; .02 INJECTION, SOLUTION INTRAVENOUS at 13:04

## 2024-06-07 RX ADMIN — SODIUM CHLORIDE, SODIUM LACTATE, POTASSIUM CHLORIDE, AND CALCIUM CHLORIDE: .6; .31; .03; .02 INJECTION, SOLUTION INTRAVENOUS at 09:29

## 2024-06-07 RX ADMIN — ONDANSETRON 4 MG: 2 INJECTION INTRAMUSCULAR; INTRAVENOUS at 08:33

## 2024-06-07 RX ADMIN — CARVEDILOL 6.25 MG: 6.25 TABLET, FILM COATED ORAL at 17:10

## 2024-06-07 RX ADMIN — FENTANYL CITRATE 25 MCG: 50 INJECTION INTRAMUSCULAR; INTRAVENOUS at 11:37

## 2024-06-07 RX ADMIN — WATER 2000 MG: 1 INJECTION INTRAMUSCULAR; INTRAVENOUS; SUBCUTANEOUS at 08:48

## 2024-06-07 RX ADMIN — FENTANYL CITRATE 25 MCG: 50 INJECTION INTRAMUSCULAR; INTRAVENOUS at 12:05

## 2024-06-07 ASSESSMENT — PAIN SCALES - GENERAL
PAINLEVEL_OUTOF10: 5
PAINLEVEL_OUTOF10: 4
PAINLEVEL_OUTOF10: 5
PAINLEVEL_OUTOF10: 7
PAINLEVEL_OUTOF10: 0
PAINLEVEL_OUTOF10: 0

## 2024-06-07 ASSESSMENT — PAIN DESCRIPTION - LOCATION
LOCATION: ABDOMEN

## 2024-06-07 ASSESSMENT — PAIN DESCRIPTION - PAIN TYPE
TYPE: SURGICAL PAIN

## 2024-06-07 ASSESSMENT — PAIN DESCRIPTION - ORIENTATION
ORIENTATION: INNER
ORIENTATION: ANTERIOR

## 2024-06-07 ASSESSMENT — PAIN DESCRIPTION - DESCRIPTORS
DESCRIPTORS: ACHING;DISCOMFORT
DESCRIPTORS: ACHING

## 2024-06-07 ASSESSMENT — PAIN DESCRIPTION - FREQUENCY
FREQUENCY: CONTINUOUS

## 2024-06-07 ASSESSMENT — PAIN - FUNCTIONAL ASSESSMENT: PAIN_FUNCTIONAL_ASSESSMENT: 0-10

## 2024-06-07 ASSESSMENT — PAIN DESCRIPTION - ONSET
ONSET: ON-GOING

## 2024-06-07 NOTE — DISCHARGE INSTRUCTIONS
Eulalio needs continued education on ostomy care.  Please assist Eulalio and his wife with ordering correct ostomy supplies.     Discharge Instructions:    Diet:   You may resume a regular diet.    Wound Care:   Skin glue was used to cover your incision(s). It will fall off on its own in about 10 days. You may shower, but do not scrub the incision sites directly or soak (tub, pool, etc.).  Continued Colostomy Home Care.     Activity:   No heavy lifting greater than a milk jug until follow up.    Pain management:   Unless informed of any restrictions by your primary care physician, please use your preferred over-the-counter pain reliever as your primary pain medication. If you have pain that persists despite over-the-counter pain medications, you have been provided with a prescription for an opioid/narcotic pain reliever.  No driving or operating machinery while taking opioid/narcotic medications.     Bowel Regimen:   Opioid/Narcotic pain relievers have a common side effect of constipation; therefore, you have been provided with a prescription for a stool softener, Docusate (Colace).  These medications are intended to help prevent you from experiencing this very common side effect and also help to regulate your bowels after surgery.   If your stools become too loose and/or frequent, decrease the Colace to one pill one time each day. If your stools are still loose after this modification, stop taking this medication all together.    Return Precautions:   Call/ Return to ED for increased redness, worsening pain, drainage from wound, fevers, or any other concerns about your incision or post op course.    Follow up with Dr. King in 1-2 weeks. Please call [phone] to schedule your appointment.

## 2024-06-07 NOTE — ANESTHESIA PRE PROCEDURE
Department of Anesthesiology  Preprocedure Note       Name:  Eulalio Gross   Age:  73 y.o.  :  1950                                          MRN:  [id number]         Date:  2024      Surgeon: Surgeon(s):  Bob King MD    Procedure: Procedure(s):  Laparoscopic versus open colostomy creation    Medications prior to admission:   Prior to Admission medications    Medication Sig Start Date End Date Taking? Authorizing Provider   amoxicillin-clavulanate (AUGMENTIN) 875-125 MG per tablet Take 1 tablet by mouth 2 times daily amoxicillin 875 mg-potassium clavulanate 125 mg tablet   Dispense date: 2024  Quantity: 14 tablet  Days supply: 7  Unit strength: 875-125 MG  Unit form: Tablet Therapy Pack  Patient not taking: Reported on 2024  Yes Harika Rader MD   levoFLOXacin (LEVAQUIN) 500 MG tablet Take 1 tablet by mouth daily evofloxacin 500 mg tablet   Dispense date: 05/15/2024  Quantity: 7 tablet  Days supply: 7  Unit strength: 500 MG  Unit form: Tablet Therapy Pack  Patient not taking: Reported on 2024 5/15/24  Yes Harika Rader MD   loperamide (IMODIUM) 2 MG capsule Take 1 capsule by mouth 4 times daily as needed 24  Yes Harika Rader MD   metroNIDAZOLE (FLAGYL) 500 MG tablet Take 1 tablet by mouth metronidazole 500 mg tablet   Dispense date: 2024  Quantity: 21 tablet  Days supply: 7  Unit strength: 500 MG  Unit form: Tablet Therapy Pack  Patient not taking: Reported on 2024  Yes Harika Rader MD   docusate sodium (COLACE) 100 MG capsule Take 1 capsule by mouth 2 times daily 5/3/24   Cortes Lazo DO   diazePAM (VALIUM) 10 MG tablet Take 1 tablet by mouth every 6 hours as needed for Anxiety.    Harika Rader MD   cholestyramine (QUESTRAN) 4 g packet Take 1 packet by mouth 2 times daily  Patient not taking: Reported on 2024   Nubia Goldberg DO   aspirin 81 MG EC tablet Take 1 tablet by mouth daily 19

## 2024-06-07 NOTE — INTERVAL H&P NOTE
Update History & Physical    The patient's History and Physical of May 28, 2024 was reviewed with the patient and I examined the patient. There was no change. The surgical site was confirmed by the patient and Dr. King.     Plan: The risks, benefits, expected outcome, and alternative to the recommended procedure have been discussed with the patient. Patient understands and wants to proceed with the procedure.     Electronically signed by Aixa Angulo DO on 6/7/2024 at 7:34 AM

## 2024-06-07 NOTE — BRIEF OP NOTE
Brief Postoperative Note      Patient: Eulalio Gross  YOB: 1950  MRN: [id number]    Date of Procedure: 6/7/2024    Pre-Op Diagnosis Codes:     * Rectourethral fistula [N36.0]    Post-Op Diagnosis: Same       Procedure(s):  Laparoscopic colostomy creation; Incisional Hernia Repair    Surgeon(s):  Bob King MD    Assistant:  Resident: Aixa Angulo DO    Anesthesia: General    Estimated Blood Loss (mL): Minimal    Complications: None    Specimens:   * No specimens in log *    Implants:  * No implants in log *      Drains:   Colostomy LUQ (Active)   Stomal Appliance 2 piece 06/07/24 0954   Stoma  Assessment Pink 06/07/24 0954   Peristomal Assessment Clean, dry & intact 06/07/24 0954   Treatment Barrier ring 06/07/24 0954       [REMOVED] Suprapubic Catheter (Removed)   $ Cystostomy/Suprapubic tube change $ Yes 05/21/24 0859   Site Assessment [place] 05/21/24 0859   Urine Color Brown 05/21/24 1001   Urine Appearance Hazy 05/21/24 1001   Urine Odor Malodorous 05/21/24 1001   Collection Container Standard 05/21/24 0920   Securement Method Securing device (Describe) 05/21/24 0859   Status Draining 05/21/24 0859   Output (mL) 55 mL 05/21/24 1001       [REMOVED] Suprapubic Catheter (Removed)       Findings:  Infection Present At Time Of Surgery (PATOS) (choose all levels that have infection present):  No infection present  Other Findings: Diverting end colostomy, incisional hernia repair.  This procedure was not performed to treat colon cancer through resection    Electronically signed by Aixa Angulo DO on 6/7/2024 at 10:46 AM

## 2024-06-07 NOTE — OP NOTE
Operative Note      Patient: Eulalio Gross  YOB: 1950  MRN: [id number]    Date of Procedure: 6/7/2024    Pre-Op Diagnosis Codes:     * Rectourethral fistula [N36.0]    Post-Op Diagnosis: Post-Op Diagnosis Codes:     * Rectourethral fistula [N36.0]       Procedure(s):  Laparoscopic colostomy creation; Incisional Hernia Repair - fat containing, incarcerated, 1 cm    Surgeon(s):  Bob King MD    Assistant:   Resident: Aixa Angulo DO    Anesthesia: General    Estimated Blood Loss (mL): Minimal    Complications: None      Findings:  Infection Present At Time Of Surgery (PATOS) (choose all levels that have infection present):  - Organ Space infection (below fascia) present as evidenced by fluid consistent with infection  Other Findings: rectourethral fistula in pelvis. No evidence of recurrence; abdominal adhesions noted; 1.5 cm incarcerated incisional hernia noted, suture repaired  This procedure was not performed to treat colon cancer through resection    Indications: Mr. Gross is a 73-year-old male.  He has a remote history of rectal cancer, and underwent chemotherapy and radiation as well as low anterior resection with diverting ileostomy and ileostomy reversal in the past.  He also underwent liver resection.  I was actually originally consulted to evaluate him for replacement of a sacral nerve stimulator, however a few weeks before his office visit with me he had had a laser procedure done by urology and he started to have urine coming through the rectum and symptoms of UTI.  I took him to the operating room performed exam under anesthesia and noted a rectourethral fistula.  He underwent further imaging and evaluation by urology which confirmed the diagnosis.  We had a very long discussion and I recommended diverting colostomy.  Given that he does not have any obstructive symptoms I plan for division of the colon and end colostomy given his previous pelvic radiation and dissection.

## 2024-06-07 NOTE — DISCHARGE INSTR - COC
Continuity of Care Form    Patient Name: Eulalio Gross   :  1950  MRN:  [id number]    Admit date:  2024  Discharge date:  ***    Code Status Order: Full Code   Advance Directives:     Admitting Physician:  Bob King MD  PCP: Nubia Goldberg DO    Discharging Nurse: ***  Discharging Hospital Unit/Room#: 5312/5312-01  Discharging Unit Phone Number: ***    Emergency Contact:   Extended Emergency Contact Information  Primary Emergency Contact: Jacquelin Gomes  Address: [address] [place] [address]  Home Phone: [phone]  Relation: Child  Secondary Emergency Contact: Bel Gross  Address: [address]  Home Phone: [phone]  Mobile Phone: [phone]  Relation: Spouse    Past Surgical History:  Past Surgical History:   Procedure Laterality Date    CHOLECYSTECTOMY      COLON SURGERY      colon stimulator    COLONOSCOPY      COLOSTOMY N/A 2024    Laparoscopic colostomy creation; Incisional Hernia Repair performed by Bob King MD at [facility] OR    ENDOSCOPY, COLON, DIAGNOSTIC      HERNIA REPAIR Bilateral 2000    Bilateral inguinal hernia repair    IR US ABSCESS FLUID DRAIN PERIT / RETROPERIT  2024    IR US ABSCESS FLUID DRAIN PERIT / RETROPERIT [facility] [place]    LIVER SURGERY N/A 2002    #6,7 segmententectomy for colon mets    RECTAL SURGERY N/A 2001    Dr Morse    SACRAL NERVE STIMULATOR PLACEMENT  2019    SMALL INTESTINE SURGERY      STIMULATOR SURGERY N/A 2024    EXAM UNDER ANESTHESIA, SIGMOIDOSCOPY, MEDTRONIC SACRAL NERVE STIMULAYOR REMOVAL performed by Bob King MD at [facility] OR    STIMULATOR SURGERY N/A 2024    . performed by Bob King MD at [facility] OR    TURP N/A 2024    CYSTOSCOPY, GREENLIGHT LASER OF THE PROSTATE performed by Anatoly Russo MD at [facility] OR       Immunization History:   Immunization History   Administered

## 2024-06-07 NOTE — ANESTHESIA POSTPROCEDURE EVALUATION
Department of Anesthesiology  Postprocedure Note    Patient: Eulalio Gross  MRN: [id number]  YOB: 1950  Date of evaluation: 6/7/2024    Procedure Summary       Date: 06/07/24 Room / Location: [address]    Anesthesia Start: 0833 Anesthesia Stop: 1048    Procedure: Laparoscopic colostomy creation; Incisional Hernia Repair Diagnosis:       Rectourethral fistula      (Rectourethral fistula [N36.0])    Surgeons: Bob King MD Responsible Provider: Brissa Esposito DO    Anesthesia Type: general, regional ASA Status: 3            Anesthesia Type: No value filed.    Nitish Phase I: Nitish Score: 9    Nitish Phase II:      Anesthesia Post Evaluation    Patient location during evaluation: PACU  Patient participation: complete - patient participated  Level of consciousness: awake and alert  Airway patency: patent  Nausea & Vomiting: no nausea and no vomiting  Cardiovascular status: blood pressure returned to baseline  Respiratory status: acceptable  Hydration status: euvolemic  Multimodal analgesia pain management approach  Pain management: adequate    No notable events documented.

## 2024-06-07 NOTE — ANESTHESIA PROCEDURE NOTES
Peripheral Block    Patient location during procedure: OR  Reason for block: procedure for pain, post-op pain management and at surgeon's request  Start time: 6/7/2024 8:40 AM  End time: 6/7/2024 8:45 AM  Staffing  Performed: anesthesiologist   Anesthesiologist: Brissa Esposito DO  Performed by: Brissa Esposito DO  Authorized by: Brissa Esposito DO    Preanesthetic Checklist  Completed: patient identified, IV checked, site marked, risks and benefits discussed, surgical/procedural consents, equipment checked, pre-op evaluation, timeout performed, anesthesia consent given, oxygen available, monitors applied/VS acknowledged, fire risk safety assessment completed and verbalized and blood product R/B/A discussed and consented  Peripheral Block   Patient position: supine  Prep: ChloraPrep  Patient monitoring: cardiac monitor, continuous pulse ox, continuous capnometry, frequent blood pressure checks and IV access  Block type: TAP  Laterality: bilateral  Injection technique: single-shot  Guidance: ultrasound guided    Needle   Needle gauge: 22 G  Needle localization: anatomical landmarks and ultrasound guidance  Assessment   Injection assessment: negative aspiration for heme, no paresthesia on injection and local visualized surrounding nerve on ultrasound  Paresthesia pain: none  Slow fractionated injection: yes  Hemodynamics: stable  Outcomes: uncomplicated and patient tolerated procedure well    Additional Notes  15 mL 0.5% Bupivacine + 10 mL exparel injected bilaterally in 5 mL increments following negative aspiration. Tip of needle in view at all times. Pt tolerated the procedure well.

## 2024-06-08 VITALS
OXYGEN SATURATION: 95 % | WEIGHT: 139 LBS | DIASTOLIC BLOOD PRESSURE: 70 MMHG | BODY MASS INDEX: 19.9 KG/M2 | RESPIRATION RATE: 16 BRPM | HEIGHT: 70 IN | TEMPERATURE: 99.2 F | SYSTOLIC BLOOD PRESSURE: 117 MMHG | HEART RATE: 70 BPM

## 2024-06-08 LAB
ALBUMIN SERPL-MCNC: 2.8 G/DL (ref 3.4–5)
ANION GAP SERPL CALCULATED.3IONS-SCNC: 8 MMOL/L (ref 3–16)
BASOPHILS # BLD: 0 K/UL (ref 0–0.2)
BASOPHILS NFR BLD: 0.3 %
BUN SERPL-MCNC: 9 MG/DL (ref 7–20)
CALCIUM SERPL-MCNC: 8.2 MG/DL (ref 8.3–10.6)
CHLORIDE SERPL-SCNC: 105 MMOL/L (ref 99–110)
CO2 SERPL-SCNC: 27 MMOL/L (ref 21–32)
CREAT SERPL-MCNC: 0.7 MG/DL (ref 0.8–1.3)
DEPRECATED RDW RBC AUTO: 16.3 % (ref 12.4–15.4)
EOSINOPHIL # BLD: 0 K/UL (ref 0–0.6)
EOSINOPHIL NFR BLD: 0.3 %
GFR SERPLBLD CREATININE-BSD FMLA CKD-EPI: >90 ML/MIN/{1.73_M2}
GLUCOSE SERPL-MCNC: 119 MG/DL (ref 70–99)
HCT VFR BLD AUTO: 33.5 % (ref 40.5–52.5)
HGB BLD-MCNC: 11.3 G/DL (ref 13.5–17.5)
LYMPHOCYTES # BLD: 1.8 K/UL (ref 1–5.1)
LYMPHOCYTES NFR BLD: 26.7 %
MAGNESIUM SERPL-MCNC: 2.2 MG/DL (ref 1.8–2.4)
MCH RBC QN AUTO: 30.1 PG (ref 26–34)
MCHC RBC AUTO-ENTMCNC: 33.8 G/DL (ref 31–36)
MCV RBC AUTO: 88.8 FL (ref 80–100)
MONOCYTES # BLD: 0.4 K/UL (ref 0–1.3)
MONOCYTES NFR BLD: 6.5 %
NEUTROPHILS # BLD: 4.3 K/UL (ref 1.7–7.7)
NEUTROPHILS NFR BLD: 66.2 %
PHOSPHATE SERPL-MCNC: 2.5 MG/DL (ref 2.5–4.9)
PLATELET # BLD AUTO: 219 K/UL (ref 135–450)
PMV BLD AUTO: 7.7 FL (ref 5–10.5)
POTASSIUM SERPL-SCNC: 3.5 MMOL/L (ref 3.5–5.1)
RBC # BLD AUTO: 3.77 M/UL (ref 4.2–5.9)
SODIUM SERPL-SCNC: 140 MMOL/L (ref 136–145)
WBC # BLD AUTO: 6.5 K/UL (ref 4–11)

## 2024-06-08 PROCEDURE — 2580000003 HC RX 258: Performed by: STUDENT IN AN ORGANIZED HEALTH CARE EDUCATION/TRAINING PROGRAM

## 2024-06-08 PROCEDURE — 6370000000 HC RX 637 (ALT 250 FOR IP): Performed by: STUDENT IN AN ORGANIZED HEALTH CARE EDUCATION/TRAINING PROGRAM

## 2024-06-08 PROCEDURE — 36415 COLL VENOUS BLD VENIPUNCTURE: CPT

## 2024-06-08 PROCEDURE — 83735 ASSAY OF MAGNESIUM: CPT

## 2024-06-08 PROCEDURE — 80069 RENAL FUNCTION PANEL: CPT

## 2024-06-08 PROCEDURE — 6360000002 HC RX W HCPCS: Performed by: STUDENT IN AN ORGANIZED HEALTH CARE EDUCATION/TRAINING PROGRAM

## 2024-06-08 PROCEDURE — 85025 COMPLETE CBC W/AUTO DIFF WBC: CPT

## 2024-06-08 RX ORDER — DOCUSATE SODIUM 100 MG/1
100 CAPSULE, LIQUID FILLED ORAL 2 TIMES DAILY
Qty: 30 CAPSULE | Refills: 0 | Status: SHIPPED | OUTPATIENT
Start: 2024-06-08

## 2024-06-08 RX ORDER — OXYCODONE HYDROCHLORIDE 5 MG/1
5 TABLET ORAL EVERY 6 HOURS PRN
Qty: 28 TABLET | Refills: 0 | Status: SHIPPED | OUTPATIENT
Start: 2024-06-08 | End: 2024-06-15

## 2024-06-08 RX ADMIN — ACETAMINOPHEN 1000 MG: 500 TABLET ORAL at 07:12

## 2024-06-08 RX ADMIN — DILTIAZEM HYDROCHLORIDE 60 MG: 30 TABLET, FILM COATED ORAL at 11:52

## 2024-06-08 RX ADMIN — OXYCODONE HYDROCHLORIDE 5 MG: 5 TABLET ORAL at 16:17

## 2024-06-08 RX ADMIN — DILTIAZEM HYDROCHLORIDE 60 MG: 30 TABLET, FILM COATED ORAL at 05:31

## 2024-06-08 RX ADMIN — SODIUM CHLORIDE, PRESERVATIVE FREE 10 ML: 5 INJECTION INTRAVENOUS at 08:35

## 2024-06-08 RX ADMIN — CARVEDILOL 6.25 MG: 6.25 TABLET, FILM COATED ORAL at 08:30

## 2024-06-08 RX ADMIN — OXYCODONE HYDROCHLORIDE 5 MG: 5 TABLET ORAL at 05:29

## 2024-06-08 RX ADMIN — ENOXAPARIN SODIUM 40 MG: 100 INJECTION SUBCUTANEOUS at 08:30

## 2024-06-08 ASSESSMENT — PAIN DESCRIPTION - DESCRIPTORS
DESCRIPTORS: ACHING
DESCRIPTORS: ACHING;DISCOMFORT

## 2024-06-08 ASSESSMENT — PAIN DESCRIPTION - ORIENTATION
ORIENTATION: LEFT
ORIENTATION: LEFT

## 2024-06-08 ASSESSMENT — PAIN DESCRIPTION - PAIN TYPE: TYPE: SURGICAL PAIN

## 2024-06-08 ASSESSMENT — PAIN DESCRIPTION - LOCATION
LOCATION: ABDOMEN
LOCATION: ABDOMEN

## 2024-06-08 ASSESSMENT — PAIN - FUNCTIONAL ASSESSMENT
PAIN_FUNCTIONAL_ASSESSMENT: ACTIVITIES ARE NOT PREVENTED
PAIN_FUNCTIONAL_ASSESSMENT: ACTIVITIES ARE NOT PREVENTED

## 2024-06-08 ASSESSMENT — PAIN DESCRIPTION - ONSET: ONSET: ON-GOING

## 2024-06-08 ASSESSMENT — PAIN SCALES - GENERAL
PAINLEVEL_OUTOF10: 6
PAINLEVEL_OUTOF10: 4

## 2024-06-08 ASSESSMENT — PAIN DESCRIPTION - FREQUENCY: FREQUENCY: INTERMITTENT

## 2024-06-08 NOTE — PROGRESS NOTES
4 Eyes Skin Assessment     NAME:  Eulalio Gross  YOB: 1950  MEDICAL RECORD NUMBER:  [id number]    The patient is being assessed for  Post-Op Surgical    I agree that at least one RN has performed a thorough Head to Toe Skin Assessment on the patient. ALL assessment sites listed below have been assessed.      Areas assessed by both nurses:    Head, Face, Ears, Shoulders, Back, Chest, Arms, Elbows, Hands, Sacrum. Buttock, Coccyx, Ischium, Legs. Feet and Heels, and Under Medical Devices         Does the Patient have a Wound? No noted wound(s)       Estrada Prevention initiated by RN: No  Wound Care Orders initiated by RN: No    Pressure Injury (Stage 3,4, Unstageable, DTI, NWPT, and Complex wounds) if present, place Wound referral order by RN under : No    New Ostomies, if present place, Ostomy referral order under : No     Nurse 1 eSignature: Electronically signed by Griffin Carlson RN on 6/7/24 at 1:10 PM EDT    **SHARE this note so that the co-signing nurse can place an eSignature**    Nurse 2 eSignature: Electronically signed by Eli Churchill RN on 6/7/24 at 4:27 PM EDT   
5/29/2024 1319 PM:    TI COMPLETED/TS    H&P TO BE SCHEDULED W/DR [id number], LABS IN EPIC 5/25 & 5/21- CBC AND BMP ROUTED TO DR HERRERA, EKG TRACING IN [facility] 2/22/24/TS     EMAIL SENT TO [facility] CHARGE NURSE TO NOTIFY PER ORDERS PT NEEDS OSTOMY MARKINGS/TS    6/5/24 1052am Requested via Teams orders/consent to be placed into Epic.-db  
Colorectal Surgery   Daily Progress Note  Patient: Eulalio Gross      CC: Rectourethral fistula       SUBJECTIVE:   Patient rested well overnight.  No acute events. Patient ambulating without difficulty. Had ostomy teaching yesterday, however has had ileostomy before. Ostomy with some output. Pain controlled. Tolerating diet.     ROS:   A 14 point review of systems was conducted, significant findings as noted above. All other systems negative.    OBJECTIVE:    PHYSICAL EXAM:    Vitals:    06/07/24 1944 06/07/24 2339 06/07/24 2349 06/08/24 0527   BP: 126/68 112/60 112/60 111/65   Pulse: 69  68 66   Resp: 16  16 16   Temp: 97.4 °F (36.3 °C)  97.8 °F (36.6 °C) 98.1 °F (36.7 °C)   TempSrc: Oral  Oral Oral   SpO2: 97%  96% 96%   Weight:       Height:           General appearance: alert, no acute distress  HEENT: No scleral icterus, EOM grossly intact  Neck: trachea midline, no JVD, neck supple  Chest/Lungs: normal effort with no accessory muscle use, no signs of respiratory distress, on RA  Cardiovascular: RRR   Abdomen: Soft, appropriately-tender, incisions c/d/i and well approximated with Dermabond. Stoma appears well perfused with output in bag.   Skin: warm and dry, no rashes  Extremities: no edema, no cyanosis  Genitourinary: suprapubic catheter in place with yellow urine.   Neuro: A&Ox3, no focal deficits, sensation intact    LABS:   Recent Labs     06/07/24  0730 06/08/24  0513   WBC 5.0 6.5   HGB 13.2* 11.3*   HCT 39.6* 33.5*   MCV 88.4 88.8    219        Recent Labs     06/07/24  0730 06/08/24  0513    140   K 3.7 3.5    105   CO2 27 27   PHOS  --  2.5   BUN 12 9   CREATININE 0.8 0.7*      No results for input(s): \"AST\", \"ALT\", \"BILIDIR\", \"BILITOT\", \"ALKPHOS\" in the last 72 hours.    Invalid input(s): \"ALB\"   No results for input(s): \"LIPASE\", \"AMYLASE\" in the last 72 hours.   No results for input(s): \"PROT\", \"INR\", \"APTT\" in the last 72 hours.   No results for input(s): \"CKTOTAL\", \"CKMB\", 
Department of Surgery  Post Op Note    Objective: Reports slight pain to abdominal area. Denies nausea and vomiting. States, \"I'm doing ok.\"      Anesthesia type: General      I/O    Intra op    Post op     Fluids    1000 75     EBL  minimal  0     Urine 50 10       Exam: VITALS:  /65   Pulse 78   Temp 97.8 °F (36.6 °C) (Oral)   Resp 16   Ht 1.778 m (5' 10\")   Wt 63 kg (139 lb)   SpO2 94%   BMI 19.94 kg/m²   Post-op vital signs:  Stable     Exam:General appearance: alert, appears stated age, and cooperative  Lungs: symmetrical chest rise on room air,  no accessory muscle  Heart: perfusing well  Abdomen: soft, tender, stoma dark red, slightly edematous with in small amount of blood in bag.   Supra pubic catheter with scant amount of yellow urine in bag.       Assessment and Plan  Pt is a 73 year old male s/p Laparoscopic colostomy creation; Incisional Hernia Repair  POD #0    Pain management-tylenol, oxycodone  Diet - Regular , Fluids NS @ 75 ml/hour   : Supra pubic catheter with scant amount of yellow urine in bag.   Ambulation: OOB to chair  Respiratory:  IS at bedside, encourage hourly IS and deep breathing  Prophylaxis: AC kay Jade, VIOLET  06/07/2024  [phone]  
New colostomy, no stool at this time. Provided pt with supplies for home and educational folder. LUNA updated for home health to assist pt with ordering supplies and education.   
PACU RN called and updated patient's family.   
PACU RN called for 5S, RN to come for bedside report.   
PACU RN gave bedside report to 5S, RN.   
PACU Transfer to Floor Note    Procedure(s):  Laparoscopic colostomy creation; Incisional Hernia Repair    Current Allergies: Penicillins and Gemfibrozil    Pt meets criteria as per Cecy Score and ASPAN Standards to transfer to next phase of care.     No results for input(s): \"POCGLU\" in the last 72 hours.    Vitals:    06/07/24 1230   BP: 128/70   Pulse: 65   Resp: 17   Temp: 97.7 °F (36.5 °C)   SpO2: 100%     Vitals within 20% of pt's admission vitals as per CECY SCORE    SpO2: 100 %    O2 Flow Rate (L/min): 2 L/min      Intake/Output Summary (Last 24 hours) at 6/7/2024 1237  Last data filed at 6/7/2024 1046  Gross per 24 hour   Intake 1100 ml   Output 135 ml   Net 965 ml       Pain assessment:  present - adequately treated    Pain Level: 5    Patient was assessed for alterations to skin integrity. There were not alterations observed.    Is patient incontinent: no    Patient has all belongings at discharge from PACU.  PACU RN called and updated patient's family.     Handoff report given at bedside.   Family updated and directed to pt room 5312  **Rin transported patient to unit.       6/7/2024 12:37 PM  
Patient Eulalio Gross to room 5312 from PACU. Patient is A&O x 4. VSS. Patient oriented to the room all safety measures in place. Patient given IS and SCDs at this time. Admission orders released and patient 4 eyes completed. Admission documentation completed. No other needs are noted at this time.    [x] Bed alarm on and cord plugged into wall  [x] Bed in lowest position  [x] Call light and bedside table within reach  [x] Patient educated on all safety measures  []Oxygen connected to wall (if applicable)     Nurse 1 Esignature: Electronically signed by Griffin Carlson RN on 6/7/24 at 1:11 PM EDT  Nurse 2 Esignature: Electronically signed by Eli Churchill RN on 6/7/24 at 4:26 PM EDT   
Patient arrived to PACU post Laparoscopic colostomy creation; Incisional Hernia Repair with Dr. King. VSS on arrival with oral airway in place. CRNA gave PACU RN report at bedside stating no complications during procedure. Surgical sites clean, dry and intact. Patient shows no signs of pain at this time. Will continue to monitor.   
Pt a&ox4. Vss this shift. Diet tolerated without complains of n/v. Pain controlled. Prn meds given. Surgical sites remains cdi. Colostomy remains in place,  cdi, with 15 ml output. Suprapubic cath in place, cdi, adequate uo. Ambulated to hallway and tolerated well. Needs met at this time. Call light within reach.    Electronically signed by Danyell Sandhu RN on 6/8/2024 at 5:51 AM   
Pt alert and oriented. IV started. Labs drawn and sent to lab. LR infusing.  
Pt ambulated the halls. Tolerated well Steady gait  
Pt scheduled with Dr. King 6/7/24 for Laparoscopic versus open colostomy creation d/t rectourethral fistula. Assessed pt's abdomen and marked the LUQ. Will follow postop.   
procedure, any hair that needs to be removed near the surgical site will be 'clipped' by a healthcare worker using a special clipper designed to avoid skin irritation.    8. Dentures, glasses, or contacts will need to be removed before your procedure. Bring cases for your glasses, contacts, dentures, or hearing aids to protect them while you are in surgery.      9. If you use a CPAP, please bring it with you on the day of your procedure.    10. If you use oxygen at home, please bring your oxygen tank with you to hospital..     11. We recommend that valuable personal belongings such as cash, cell phones, e-tablets, or jewelry, be left at home during your stay. The hospital will not be responsible for valuables that are not secured in the hospital safe. However, if your insurance requires a co-pay, you may want to bring a method of payment, i.e., Check or credit card, if you wish to pay your co-pay the day of surgery.      12. If you are to stay overnight, you may bring a bag with personal items. Please have any large items you may need brought in by your family after your arrival to your hospital room.    13. If you have a Living Will or Durable Power of , please bring a copy on the day of your procedure.     How we keep you safe and work to prevent surgical site infections:   1. Health care workers should always check your ID bracelet to verify your name and birth date. You will be asked many times to state your name, date of birth, and allergies.    2. Health care workers should always clean their hands with soap or alcohol gel before providing care to you. It is okay to ask anyone if they cleaned their hands before they touch you.    3. You will be actively involved in verifying the type of procedure you are having and ensuring the correct surgical site. This will be confirmed multiple times prior to your procedure. Do NOT ana your surgery site UNLESS instructed to by your surgeon.     4. When you are in

## 2024-06-08 NOTE — PLAN OF CARE
Problem: Discharge Planning  Goal: Discharge to home or other facility with appropriate resources  6/8/2024 0952 by Augusta Corley RN  Outcome: Progressing  6/8/2024 0113 by Danyell Sandhu RN  Outcome: Progressing     Problem: Pain  Goal: Verbalizes/displays adequate comfort level or baseline comfort level  6/8/2024 0952 by Augusta Corley RN  Outcome: Progressing  6/8/2024 0113 by Danyell Sandhu RN  Outcome: Progressing     Problem: ABCDS Injury Assessment  Goal: Absence of physical injury  6/8/2024 0952 by Augusta Corley RN  Outcome: Progressing  6/8/2024 0113 by Danyell Sandhu RN  Outcome: Progressing     Problem: Safety - Adult  Goal: Free from fall injury  6/8/2024 0952 by Augusta Corley RN  Outcome: Progressing  6/8/2024 0113 by Danyell Sandhu RN  Outcome: Progressing

## 2024-06-08 NOTE — PLAN OF CARE
Problem: Safety - Adult  Goal: Free from fall injury  Outcome: Progressing     Problem: Pain  Goal: Verbalizes/displays adequate comfort level or baseline comfort level  6/8/2024 0113 by Danyell Sandhu RN  Outcome: Progressing  6/7/2024 1311 by Griffin Carlson RN  Outcome: Progressing     Problem: ABCDS Injury Assessment  Goal: Absence of physical injury  6/8/2024 0113 by Danyell Sandhu RN  Outcome: Progressing  6/7/2024 1311 by Griffin Carlson RN  Outcome: Progressing

## 2024-06-11 NOTE — CARE COORDINATION
Patient needed home care for a new ostomy at d/c. Home care was not arranged prior to d/c. I called his wife to ask if they had a preference in agency and they do not. I was able to get [facility] to provide home care and they will see the patient tomorrow.     Electronically signed by Kathia Parker RN on 6/11/2024 at 11:45 AM  [phone]

## 2024-06-14 ENCOUNTER — TELEPHONE (OUTPATIENT)
Dept: SURGERY | Age: 74
End: 2024-06-14

## 2024-06-14 NOTE — TELEPHONE ENCOUNTER
Laparoscopic versus open colostomy creation [[id number]] completed 06/07/2024.     Patient in office today to request medical records [facility]. Patient states the requested records are to be sent via fax to fax number [phone] mark Martinez.     Patient can be reached at [phone] with any questions.

## 2024-06-25 ENCOUNTER — OFFICE VISIT (OUTPATIENT)
Dept: SURGERY | Age: 74
End: 2024-06-25

## 2024-06-25 VITALS
OXYGEN SATURATION: 99 % | BODY MASS INDEX: 19.76 KG/M2 | SYSTOLIC BLOOD PRESSURE: 134 MMHG | HEART RATE: 50 BPM | TEMPERATURE: 98 F | WEIGHT: 138 LBS | HEIGHT: 70 IN | DIASTOLIC BLOOD PRESSURE: 73 MMHG

## 2024-06-25 DIAGNOSIS — N36.0 RECTOURETHRAL FISTULA: Primary | ICD-10-CM

## 2024-06-25 PROCEDURE — 99024 POSTOP FOLLOW-UP VISIT: CPT | Performed by: SURGERY

## 2024-06-25 NOTE — PROGRESS NOTES
[facility] PHYSICIANS [place] SPECIALTY CARE [facility] COLORECTAL SURGERY  [address] [place] [address]  [address]  [place] [address]  Dept: [phone]  Dept Fax: [phone]  Loc: [phone]    Visit Date: 6/25/2024    Eulalio Gross is a 73 y.o. male who presents today for: Post-Op Check (Rectourethral fistula   6-7-24)      Subjective:     Eulalio Gross is a 73 y.o. male here for postoperative visit after laparoscopic diverting colostomy 2 weeks ago for rectourethral fistula.    Overall feeling much better.  Managing his colostomy without too much trouble.    Patient's problem list, medications, past medical, surgical, family, and social histories were reviewed and updated in the chart as indicated today.    Objective:     /73   Pulse 50   Temp 98 °F (36.7 °C) (Infrared)   Ht 1.778 m (5' 10\")   Wt 62.6 kg (138 lb)   SpO2 99%   BMI 19.80 kg/m²     Abdominal/wound: Laparoscopic incisions healing well, colostomy pink and viable    Assessment/Plan:       ASSESSMENT/PLAN:    Overall feeling much better after laparoscopic end sigmoid colostomy creation.  Discussed with him the natural history and course of rectourethral fistulas.  Unfortunately, would not be a candidate for repair from the colon side and would likely need APR.  Will touch base with urology to assess comfort level with repair of the fistula from his standpoint.  Discussed that occasionally we refer these patients to [facility].    Regardless, I would like to wait at least 3 to 4 months before reassessment as he is overall feeling much better and wants to continue to build up strength before considering any other surgery.    DISPOSITION: Follow-up 3 to 4-month    Note completed using dictation software, please excuse any errors.    Referring/primary care physician updated through [facility] note if PCP was listed.    Electronically signed by Bob King MD on 6/25/2024 at 1:54 PM

## 2024-07-19 ENCOUNTER — TELEPHONE (OUTPATIENT)
Dept: FAMILY MEDICINE CLINIC | Age: 74
End: 2024-07-19

## 2024-07-19 NOTE — TELEPHONE ENCOUNTER
Tabby with [facility] called to report patient's pulse was 47 yesterday ( she finished his visit after hours, so she is just now reporting). She took that both manually and with the pulse ox, and it was the same.     Confidential  [phone]

## 2024-07-19 NOTE — TELEPHONE ENCOUNTER
Left message asking patient to come in for appt.  Left message on Tabby's voicemail that patient needs to be seen.

## 2024-10-22 ENCOUNTER — OFFICE VISIT (OUTPATIENT)
Dept: SURGERY | Age: 74
End: 2024-10-22
Payer: MEDICARE

## 2024-10-22 VITALS
WEIGHT: 156 LBS | TEMPERATURE: 97.7 F | HEART RATE: 52 BPM | RESPIRATION RATE: 16 BRPM | OXYGEN SATURATION: 98 % | BODY MASS INDEX: 22.38 KG/M2 | DIASTOLIC BLOOD PRESSURE: 67 MMHG | SYSTOLIC BLOOD PRESSURE: 128 MMHG

## 2024-10-22 DIAGNOSIS — N36.0 RECTOURETHRAL FISTULA: Primary | ICD-10-CM

## 2024-10-22 PROCEDURE — G8484 FLU IMMUNIZE NO ADMIN: HCPCS | Performed by: SURGERY

## 2024-10-22 PROCEDURE — G8420 CALC BMI NORM PARAMETERS: HCPCS | Performed by: SURGERY

## 2024-10-22 PROCEDURE — 3078F DIAST BP <80 MM HG: CPT | Performed by: SURGERY

## 2024-10-22 PROCEDURE — 99214 OFFICE O/P EST MOD 30 MIN: CPT | Performed by: SURGERY

## 2024-10-22 PROCEDURE — 1123F ACP DISCUSS/DSCN MKR DOCD: CPT | Performed by: SURGERY

## 2024-10-22 PROCEDURE — 3017F COLORECTAL CA SCREEN DOC REV: CPT | Performed by: SURGERY

## 2024-10-22 PROCEDURE — 1036F TOBACCO NON-USER: CPT | Performed by: SURGERY

## 2024-10-22 PROCEDURE — G8428 CUR MEDS NOT DOCUMENT: HCPCS | Performed by: SURGERY

## 2024-10-22 PROCEDURE — 3074F SYST BP LT 130 MM HG: CPT | Performed by: SURGERY

## 2024-10-22 NOTE — PROGRESS NOTES
[facility] PHYSICIANS [place] SPECIALTY CARE [facility] COLORECTAL SURGERY  [address] [place] [address]  [address]  [place] [address]  Dept: [phone]  Dept Fax: [phone]  Loc: [phone]    Visit Date: 10/22/2024    Eulalio Gross is a 74 y.o. male who presents today for: Follow-up (Follow up Rectourethral fistula. Patient reports he feel weak but isn't experiencing any symptoms/)      HPI:       Eulalio Gross is a 74 y.o. 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  I last saw him about 4 months ago, and after I performed a lap diverting end colostomy.  He is also seeing urology and had a suprapubic tube placement.  Despite this, he is still having symptoms, including drainage, discomfort.  He is interested in pursuing definitive surgery.        Objective:     Physical Exam   /67   Pulse 52   Temp 97.7 °F (36.5 °C) (Infrared)   Resp 16   Wt 70.8 kg (156 lb)   SpO2 98%   BMI 22.38 kg/m²   Constitutional: Appears well-developed and well-nourished. Grooming appropriate. No gross deformities. Body mass index is 22.38 kg/m².    Abdominal/wound: Soft, colostomy present, pink and viable      Labs reviewed: none  Imaging reviewed: none  Coordination/discussion of care: Coordination of care with urology    No colonoscopy on file   No cologuard on file  No FIT/FOBT on file   No flexible sigmoidoscopy on file      Assessment/Plan:       A/P:  Established problem(s): Rectourethral fistula, status post diverting colostomy  Additional workup/treatment planned: Robotic APR with plastics reconstruction if urology willing/able  Risk of complications/morbidity: Very high    Unfortunately, despite fecal and urinary diversion, Eulalio is still having symptoms, including drainage, pain, chronic discomfort.  He is interested in definitive surgical management, which I think is reasonable at this point.  I discussed robotic versus laparoscopic

## 2025-04-10 ENCOUNTER — OFFICE VISIT (OUTPATIENT)
Dept: FAMILY MEDICINE CLINIC | Age: 75
End: 2025-04-10

## 2025-04-10 VITALS
DIASTOLIC BLOOD PRESSURE: 78 MMHG | RESPIRATION RATE: 17 BRPM | SYSTOLIC BLOOD PRESSURE: 124 MMHG | WEIGHT: 153.6 LBS | OXYGEN SATURATION: 98 % | BODY MASS INDEX: 22.04 KG/M2 | HEART RATE: 77 BPM | TEMPERATURE: 97.3 F

## 2025-04-10 DIAGNOSIS — I42.8 NON-ISCHEMIC CARDIOMYOPATHY (HCC): ICD-10-CM

## 2025-04-10 DIAGNOSIS — C18.9 MALIGNANT NEOPLASM OF COLON, UNSPECIFIED PART OF COLON (HCC): ICD-10-CM

## 2025-04-10 DIAGNOSIS — J01.00 ACUTE NON-RECURRENT MAXILLARY SINUSITIS: Primary | ICD-10-CM

## 2025-04-10 DIAGNOSIS — F33.1 MAJOR DEPRESSIVE DISORDER, RECURRENT, MODERATE (HCC): ICD-10-CM

## 2025-04-10 DIAGNOSIS — Z93.3 S/P COLOSTOMY (HCC): ICD-10-CM

## 2025-04-10 RX ORDER — LEVOFLOXACIN 500 MG/1
TABLET, FILM COATED ORAL
COMMUNITY
Start: 2025-01-27 | End: 2025-04-10

## 2025-04-10 RX ORDER — FLUCONAZOLE 150 MG/1
TABLET ORAL
COMMUNITY
Start: 2025-01-27 | End: 2025-04-10

## 2025-04-10 RX ORDER — SULFAMETHOXAZOLE AND TRIMETHOPRIM 800; 160 MG/1; MG/1
1 TABLET ORAL 2 TIMES DAILY
Qty: 10 TABLET | Refills: 0 | Status: SHIPPED | OUTPATIENT
Start: 2025-04-10 | End: 2025-04-15

## 2025-04-10 SDOH — ECONOMIC STABILITY: FOOD INSECURITY: WITHIN THE PAST 12 MONTHS, YOU WORRIED THAT YOUR FOOD WOULD RUN OUT BEFORE YOU GOT MONEY TO BUY MORE.: NEVER TRUE

## 2025-04-10 SDOH — ECONOMIC STABILITY: FOOD INSECURITY: WITHIN THE PAST 12 MONTHS, THE FOOD YOU BOUGHT JUST DIDN'T LAST AND YOU DIDN'T HAVE MONEY TO GET MORE.: NEVER TRUE

## 2025-04-10 ASSESSMENT — PATIENT HEALTH QUESTIONNAIRE - PHQ9
SUM OF ALL RESPONSES TO PHQ QUESTIONS 1-9: 14
SUM OF ALL RESPONSES TO PHQ QUESTIONS 1-9: 14
8. MOVING OR SPEAKING SO SLOWLY THAT OTHER PEOPLE COULD HAVE NOTICED. OR THE OPPOSITE, BEING SO FIGETY OR RESTLESS THAT YOU HAVE BEEN MOVING AROUND A LOT MORE THAN USUAL: NOT AT ALL
5. POOR APPETITE OR OVEREATING: NEARLY EVERY DAY
4. FEELING TIRED OR HAVING LITTLE ENERGY: NEARLY EVERY DAY
6. FEELING BAD ABOUT YOURSELF - OR THAT YOU ARE A FAILURE OR HAVE LET YOURSELF OR YOUR FAMILY DOWN: SEVERAL DAYS
SUM OF ALL RESPONSES TO PHQ QUESTIONS 1-9: 14
SUM OF ALL RESPONSES TO PHQ QUESTIONS 1-9: 14
9. THOUGHTS THAT YOU WOULD BE BETTER OFF DEAD, OR OF HURTING YOURSELF: NOT AT ALL
7. TROUBLE CONCENTRATING ON THINGS, SUCH AS READING THE NEWSPAPER OR WATCHING TELEVISION: NOT AT ALL
2. FEELING DOWN, DEPRESSED OR HOPELESS: SEVERAL DAYS
3. TROUBLE FALLING OR STAYING ASLEEP: NEARLY EVERY DAY
1. LITTLE INTEREST OR PLEASURE IN DOING THINGS: NEARLY EVERY DAY
10. IF YOU CHECKED OFF ANY PROBLEMS, HOW DIFFICULT HAVE THESE PROBLEMS MADE IT FOR YOU TO DO YOUR WORK, TAKE CARE OF THINGS AT HOME, OR GET ALONG WITH OTHER PEOPLE: NOT DIFFICULT AT ALL

## 2025-04-10 ASSESSMENT — ANXIETY QUESTIONNAIRES
6. BECOMING EASILY ANNOYED OR IRRITABLE: NEARLY EVERY DAY
4. TROUBLE RELAXING: NEARLY EVERY DAY
GAD7 TOTAL SCORE: 18
3. WORRYING TOO MUCH ABOUT DIFFERENT THINGS: NEARLY EVERY DAY
7. FEELING AFRAID AS IF SOMETHING AWFUL MIGHT HAPPEN: NOT AT ALL
2. NOT BEING ABLE TO STOP OR CONTROL WORRYING: NEARLY EVERY DAY
IF YOU CHECKED OFF ANY PROBLEMS ON THIS QUESTIONNAIRE, HOW DIFFICULT HAVE THESE PROBLEMS MADE IT FOR YOU TO DO YOUR WORK, TAKE CARE OF THINGS AT HOME, OR GET ALONG WITH OTHER PEOPLE: NOT DIFFICULT AT ALL
1. FEELING NERVOUS, ANXIOUS, OR ON EDGE: NEARLY EVERY DAY
5. BEING SO RESTLESS THAT IT IS HARD TO SIT STILL: NEARLY EVERY DAY

## 2025-04-10 NOTE — PROGRESS NOTES
4/10/2025    History of Present Illness  The patient presents for evaluation of sinus issues, stress, and medication management.    He reports no sore throat or fever but experiences significant nasal congestion, which he attributes to sinus issues. This is the first instance of voice loss associated with his symptoms. Frequent rhinorrhea and occasional expectoration of mucus are noted. A morning cough was present but has since resolved. He has been self-medicating with Allegra, which he finds ineffective. Mucinex has not been attempted. There is a history of using Bactrim for urinary tract infections, which he found beneficial.    A bladder and prostate removal surgery is scheduled, although the exact date is yet to be determined. He has been on Seroquel for sleep management, which he reports as highly effective, providing approximately 4 hours of sleep per night. Diazepam is taken as needed for relaxation, which he finds beneficial. He prefers to limit his medication intake and [facility]    A piece of his liver was removed due to colon cancer that had metastasized to the liver. He has had three operations for bowel issues, and his appendix and gallbladder were removed.    PAST SURGICAL HISTORY:  - Liver resection due to metastasized colon cancer  - Three operations for bowel issues  - Appendix removal  - Gallbladder removal    This is a 74 y.o. male   Chief Complaint   Patient presents with    Cough     coughing up yellow mucus, feels like mucus is caught in throat.    .    HPI     Patient Active Problem List   Diagnosis    HTN (hypertension)    PTSD (post-traumatic stress disorder)    Insomnia    Hypercholesteremia    Acute serous otitis media    Major depressive disorder, recurrent, moderate (HCC)    Malignant neoplasm of colon, unspecified    Coronary atherosclerosis    Full incontinence of feces    Neurostimulator device in situ    Rectourethral fistula

## 2025-05-24 ENCOUNTER — OFFICE VISIT (OUTPATIENT)
Dept: URGENT CARE | Age: 75
End: 2025-05-24

## 2025-05-24 VITALS
BODY MASS INDEX: 21.67 KG/M2 | HEART RATE: 56 BPM | SYSTOLIC BLOOD PRESSURE: 132 MMHG | OXYGEN SATURATION: 97 % | DIASTOLIC BLOOD PRESSURE: 68 MMHG | TEMPERATURE: 98.1 F | WEIGHT: 151 LBS

## 2025-05-24 DIAGNOSIS — R05.1 ACUTE COUGH: ICD-10-CM

## 2025-05-24 DIAGNOSIS — R09.81 NASAL CONGESTION: ICD-10-CM

## 2025-05-24 DIAGNOSIS — J34.89 RHINORRHEA: ICD-10-CM

## 2025-05-24 DIAGNOSIS — R09.82 POSTNASAL DRIP: ICD-10-CM

## 2025-05-24 DIAGNOSIS — B96.89 ACUTE BACTERIAL SINUSITIS: Primary | ICD-10-CM

## 2025-05-24 DIAGNOSIS — J01.90 ACUTE BACTERIAL SINUSITIS: Primary | ICD-10-CM

## 2025-05-24 PROBLEM — R63.6 UNDERWEIGHT: Status: ACTIVE | Noted: 2025-05-24

## 2025-05-24 PROBLEM — Z77.29 EXPOSURE TO POTENTIALLY HAZARDOUS SUBSTANCE: Status: ACTIVE | Noted: 2025-05-24

## 2025-05-24 PROBLEM — N30.01 ACUTE CYSTITIS WITH HEMATURIA: Status: ACTIVE | Noted: 2025-05-24

## 2025-05-24 PROBLEM — Z71.9 COUNSELING, UNSPECIFIED: Status: ACTIVE | Noted: 2025-05-24

## 2025-05-24 PROBLEM — K60.429: Status: ACTIVE | Noted: 2025-05-24

## 2025-05-24 PROBLEM — M62.81 MUSCLE WEAKNESS (GENERALIZED): Status: ACTIVE | Noted: 2025-05-24

## 2025-05-24 PROBLEM — R54 AGE-RELATED PHYSICAL DEBILITY: Status: ACTIVE | Noted: 2025-05-24

## 2025-05-24 RX ORDER — PRAVASTATIN SODIUM 40 MG
20 TABLET ORAL DAILY
COMMUNITY

## 2025-05-24 RX ORDER — GUAIFENESIN 600 MG/1
600 TABLET, EXTENDED RELEASE ORAL 2 TIMES DAILY
Qty: 20 TABLET | Refills: 0 | Status: SHIPPED | OUTPATIENT
Start: 2025-05-24 | End: 2025-06-03

## 2025-05-24 RX ORDER — SULFAMETHOXAZOLE AND TRIMETHOPRIM 800; 160 MG/1; MG/1
1 TABLET ORAL 2 TIMES DAILY
Qty: 14 TABLET | Refills: 0 | Status: SHIPPED | OUTPATIENT
Start: 2025-05-24 | End: 2025-05-31

## 2025-05-24 RX ORDER — DIAZEPAM 10 MG/1
10 TABLET ORAL 3 TIMES DAILY
COMMUNITY

## 2025-05-24 RX ORDER — DEXTROMETHORPHAN HBR AND PYRILAMINE MALEATE 30; 30 MG/1; MG/1
1 TABLET ORAL 4 TIMES DAILY PRN
Qty: 40 TABLET | Refills: 0 | Status: SHIPPED | OUTPATIENT
Start: 2025-05-24

## 2025-05-24 ASSESSMENT — ENCOUNTER SYMPTOMS
SINUS PRESSURE: 0
EYE REDNESS: 0
SORE THROAT: 1
VOMITING: 0
DIARRHEA: 0
SHORTNESS OF BREATH: 0
COUGH: 1
CHEST TIGHTNESS: 0
EYE DISCHARGE: 0
VOICE CHANGE: 0
SINUS PAIN: 0
WHEEZING: 0
EYE ITCHING: 0
RHINORRHEA: 1
ABDOMINAL PAIN: 0
NAUSEA: 0

## 2025-05-24 ASSESSMENT — VISUAL ACUITY: OU: 1

## 2025-05-24 NOTE — PROGRESS NOTES
Vision grossly intact.      Extraocular Movements: Extraocular movements intact.      Conjunctiva/sclera: Conjunctivae normal.      Pupils: Pupils are equal, round, and reactive to light.   Neck:      Trachea: Trachea and phonation normal.   Cardiovascular:      Rate and Rhythm: Normal rate and regular rhythm.      Heart sounds: Normal heart sounds.   Pulmonary:      Effort: Pulmonary effort is normal. No tachypnea or respiratory distress.      Breath sounds: Normal breath sounds. No stridor. No wheezing, rhonchi or rales.   Chest:      Chest wall: No tenderness.   Musculoskeletal:      Cervical back: Full passive range of motion without pain, normal range of motion and neck supple. No rigidity or tenderness. No pain with movement or muscular tenderness. Normal range of motion.   Lymphadenopathy:      Head:      Right side of head: No submental, submandibular, tonsillar, preauricular, posterior auricular or occipital adenopathy.      Left side of head: No submental, submandibular, tonsillar, preauricular, posterior auricular or occipital adenopathy.      Cervical: No cervical adenopathy.   Skin:     General: Skin is warm and dry.   Neurological:      General: No focal deficit present.      Mental Status: He is alert and oriented to person, place, and time.   Psychiatric:         Behavior: Behavior is cooperative.         PROCEDURES:  Unless otherwise noted below, none     Procedures    RESULTS:  No results found for this visit on 05/24/25.      An electronic signature was used to authenticate this note.  --Miguel Mabry PA-C      This chart was generated in part by using Dragon Dictation system and may contain errors related to that system including errors in grammar, punctuation, and spelling, as well as words and phrases that may be inappropriate. If there are any questions or concerns please feel free to contact the dictating provider for clarification.

## 2025-05-24 NOTE — PATIENT INSTRUCTIONS
Eulalio,    Thank you for trusting [facility] with your care. Your decision to come to us means a lot and we are honored to be part of your healthcare journey. We value your trust and hope your experience with us was positive and met your expectations.    We're always looking for ways to improve, and your feedback is incredibly important to us. You will receive a text or email soon asking you how your visit went. for If you could take a moment to share your thoughts, it would mean the world to us. Your input helps us better serve you and others in the community.     Thank you again for choosing us. We're grateful for the opportunity to care for you and your loved ones. We hope to see you again - though we always wish you health and wellness!    Warm regards,    [facility]    Miguel Mabry PA-C, Herlinda, (Clinical Supervisor + Registration), and Alma (Medical Assistant)      Bactrim is prescribed for antibiotic treatment of the sinus infection  Take the antibiotics until completed, do not stop unless otherwise directed by a healthcare provider.  Recommend daily yogurt or other probiotics while on antibiotics.  [place] DMT prescribed for cough and congestion relief.  If the pharmacy does not have this medication in stock, you can use over-the-counter Coricidin for similar relief.  Guaifenesin (Mucinex) prescribed for expectorant therapy.    Recommend OTC treatment for symptoms:  acetaminophen (Tylenol) for fevers and pain relief.  antihistamines (Claritin, Zyrtec, Allegra, or Xyzal) and nasal steroid sprays (such as Flonase) to help with nasal congestion and runny nose.  guaifenesin (Mucinex) can help with thinning out mucus which can help with chest congestion or with relieving persistent sinus pressure  throat sprays (Cepacol, chloraseptic) for throat pain.  dextromethorphan (Robitussin, Delsym), throat lozenges, and increased water intake for cough.  honey (1-2 teaspoons

## 2025-06-16 SDOH — ECONOMIC STABILITY: INCOME INSECURITY: IN THE LAST 12 MONTHS, WAS THERE A TIME WHEN YOU WERE NOT ABLE TO PAY THE MORTGAGE OR RENT ON TIME?: NO

## 2025-06-16 SDOH — ECONOMIC STABILITY: FOOD INSECURITY: WITHIN THE PAST 12 MONTHS, YOU WORRIED THAT YOUR FOOD WOULD RUN OUT BEFORE YOU GOT MONEY TO BUY MORE.: NEVER TRUE

## 2025-06-16 SDOH — ECONOMIC STABILITY: FOOD INSECURITY: WITHIN THE PAST 12 MONTHS, THE FOOD YOU BOUGHT JUST DIDN'T LAST AND YOU DIDN'T HAVE MONEY TO GET MORE.: NEVER TRUE

## 2025-06-16 SDOH — ECONOMIC STABILITY: TRANSPORTATION INSECURITY
IN THE PAST 12 MONTHS, HAS THE LACK OF TRANSPORTATION KEPT YOU FROM MEDICAL APPOINTMENTS OR FROM GETTING MEDICATIONS?: NO

## 2025-06-16 SDOH — ECONOMIC STABILITY: TRANSPORTATION INSECURITY
IN THE PAST 12 MONTHS, HAS LACK OF TRANSPORTATION KEPT YOU FROM MEETINGS, WORK, OR FROM GETTING THINGS NEEDED FOR DAILY LIVING?: NO

## 2025-06-16 ASSESSMENT — PATIENT HEALTH QUESTIONNAIRE - PHQ9
6. FEELING BAD ABOUT YOURSELF - OR THAT YOU ARE A FAILURE OR HAVE LET YOURSELF OR YOUR FAMILY DOWN: SEVERAL DAYS
6. FEELING BAD ABOUT YOURSELF - OR THAT YOU ARE A FAILURE OR HAVE LET YOURSELF OR YOUR FAMILY DOWN: SEVERAL DAYS
7. TROUBLE CONCENTRATING ON THINGS, SUCH AS READING THE NEWSPAPER OR WATCHING TELEVISION: MORE THAN HALF THE DAYS
3. TROUBLE FALLING OR STAYING ASLEEP: NEARLY EVERY DAY
SUM OF ALL RESPONSES TO PHQ9 QUESTIONS 1 & 2: 5
2. FEELING DOWN, DEPRESSED OR HOPELESS: MORE THAN HALF THE DAYS
4. FEELING TIRED OR HAVING LITTLE ENERGY: NEARLY EVERY DAY
SUM OF ALL RESPONSES TO PHQ QUESTIONS 1-9: 17
10. IF YOU CHECKED OFF ANY PROBLEMS, HOW DIFFICULT HAVE THESE PROBLEMS MADE IT FOR YOU TO DO YOUR WORK, TAKE CARE OF THINGS AT HOME, OR GET ALONG WITH OTHER PEOPLE: NOT DIFFICULT AT ALL
9. THOUGHTS THAT YOU WOULD BE BETTER OFF DEAD, OR OF HURTING YOURSELF: NOT AT ALL
SUM OF ALL RESPONSES TO PHQ QUESTIONS 1-9: 17
5. POOR APPETITE OR OVEREATING: MORE THAN HALF THE DAYS
SUM OF ALL RESPONSES TO PHQ QUESTIONS 1-9: 17
8. MOVING OR SPEAKING SO SLOWLY THAT OTHER PEOPLE COULD HAVE NOTICED. OR THE OPPOSITE - BEING SO FIDGETY OR RESTLESS THAT YOU HAVE BEEN MOVING AROUND A LOT MORE THAN USUAL: SEVERAL DAYS
SUM OF ALL RESPONSES TO PHQ QUESTIONS 1-9: 17
3. TROUBLE FALLING OR STAYING ASLEEP: NEARLY EVERY DAY
2. FEELING DOWN, DEPRESSED OR HOPELESS: MORE THAN HALF THE DAYS
5. POOR APPETITE OR OVEREATING: MORE THAN HALF THE DAYS
10. IF YOU CHECKED OFF ANY PROBLEMS, HOW DIFFICULT HAVE THESE PROBLEMS MADE IT FOR YOU TO DO YOUR WORK, TAKE CARE OF THINGS AT HOME, OR GET ALONG WITH OTHER PEOPLE: NOT DIFFICULT AT ALL
1. LITTLE INTEREST OR PLEASURE IN DOING THINGS: NEARLY EVERY DAY
1. LITTLE INTEREST OR PLEASURE IN DOING THINGS: NEARLY EVERY DAY
9. THOUGHTS THAT YOU WOULD BE BETTER OFF DEAD, OR OF HURTING YOURSELF: NOT AT ALL
SUM OF ALL RESPONSES TO PHQ QUESTIONS 1-9: 17
4. FEELING TIRED OR HAVING LITTLE ENERGY: NEARLY EVERY DAY
7. TROUBLE CONCENTRATING ON THINGS, SUCH AS READING THE NEWSPAPER OR WATCHING TELEVISION: MORE THAN HALF THE DAYS
8. MOVING OR SPEAKING SO SLOWLY THAT OTHER PEOPLE COULD HAVE NOTICED. OR THE OPPOSITE, BEING SO FIGETY OR RESTLESS THAT YOU HAVE BEEN MOVING AROUND A LOT MORE THAN USUAL: SEVERAL DAYS

## 2025-06-16 ASSESSMENT — ANXIETY QUESTIONNAIRES
3. WORRYING TOO MUCH ABOUT DIFFERENT THINGS: NOT AT ALL
3. WORRYING TOO MUCH ABOUT DIFFERENT THINGS: NOT AT ALL
2. NOT BEING ABLE TO STOP OR CONTROL WORRYING: NOT AT ALL
7. FEELING AFRAID AS IF SOMETHING AWFUL MIGHT HAPPEN: NOT AT ALL
6. BECOMING EASILY ANNOYED OR IRRITABLE: MORE THAN HALF THE DAYS
IF YOU CHECKED OFF ANY PROBLEMS ON THIS QUESTIONNAIRE, HOW DIFFICULT HAVE THESE PROBLEMS MADE IT FOR YOU TO DO YOUR WORK, TAKE CARE OF THINGS AT HOME, OR GET ALONG WITH OTHER PEOPLE: SOMEWHAT DIFFICULT
5. BEING SO RESTLESS THAT IT IS HARD TO SIT STILL: NEARLY EVERY DAY
IF YOU CHECKED OFF ANY PROBLEMS ON THIS QUESTIONNAIRE, HOW DIFFICULT HAVE THESE PROBLEMS MADE IT FOR YOU TO DO YOUR WORK, TAKE CARE OF THINGS AT HOME, OR GET ALONG WITH OTHER PEOPLE: SOMEWHAT DIFFICULT
1. FEELING NERVOUS, ANXIOUS, OR ON EDGE: NEARLY EVERY DAY
5. BEING SO RESTLESS THAT IT IS HARD TO SIT STILL: NEARLY EVERY DAY
7. FEELING AFRAID AS IF SOMETHING AWFUL MIGHT HAPPEN: NOT AT ALL
6. BECOMING EASILY ANNOYED OR IRRITABLE: MORE THAN HALF THE DAYS
1. FEELING NERVOUS, ANXIOUS, OR ON EDGE: NEARLY EVERY DAY
2. NOT BEING ABLE TO STOP OR CONTROL WORRYING: NOT AT ALL
GAD7 TOTAL SCORE: 11
4. TROUBLE RELAXING: NEARLY EVERY DAY
4. TROUBLE RELAXING: NEARLY EVERY DAY

## 2025-06-17 ENCOUNTER — OFFICE VISIT (OUTPATIENT)
Dept: FAMILY MEDICINE CLINIC | Age: 75
End: 2025-06-17

## 2025-06-17 VITALS
BODY MASS INDEX: 21.38 KG/M2 | SYSTOLIC BLOOD PRESSURE: 130 MMHG | OXYGEN SATURATION: 95 % | DIASTOLIC BLOOD PRESSURE: 64 MMHG | TEMPERATURE: 99.1 F | WEIGHT: 149 LBS | HEART RATE: 74 BPM | RESPIRATION RATE: 17 BRPM

## 2025-06-17 DIAGNOSIS — J01.00 ACUTE NON-RECURRENT MAXILLARY SINUSITIS: Primary | ICD-10-CM

## 2025-06-17 RX ORDER — FEXOFENADINE HCL AND PSEUDOEPHEDRINE HCL 180; 240 MG/1; MG/1
1 TABLET, EXTENDED RELEASE ORAL DAILY
Qty: 15 TABLET | Refills: 0 | Status: SHIPPED | OUTPATIENT
Start: 2025-06-17

## 2025-06-17 RX ORDER — PREDNISONE 20 MG/1
20 TABLET ORAL DAILY
Qty: 5 TABLET | Refills: 0 | Status: SHIPPED | OUTPATIENT
Start: 2025-06-17 | End: 2025-06-22

## 2025-06-17 RX ORDER — FLUTICASONE PROPIONATE 50 MCG
1 SPRAY, SUSPENSION (ML) NASAL DAILY
Qty: 16 G | Refills: 0 | Status: SHIPPED | OUTPATIENT
Start: 2025-06-17

## 2025-06-17 NOTE — PROGRESS NOTES
6/17/2025    History of Present Illness  The patient presents for evaluation of sinus issues.    He sought medical attention at an urgent care facility on 06/15/2025 due to the severity of his symptoms, which included hot flashes and cold chills. He reports persistent nasal congestion and a constant sensation of dripping but does not experience any ear pain. He has been producing a significant amount of mucus when coughing. Despite being prescribed Mucinex twice daily, he reports no improvement in his symptoms. He has previously completed two courses of antibiotics without any noticeable benefit. He has a history of Bell's palsy, for which he was treated with steroids.    He is scheduled for a preoperative evaluation on 06/30/2025, with surgery planned for early June 2025. He anticipates a hospital stay of several weeks post-surgery.    This is a 74 y.o. male   Chief Complaint   Patient presents with    Congestion     Going on since April    .    HPI     Patient Active Problem List   Diagnosis    HTN (hypertension)    PTSD (post-traumatic stress disorder)    Insomnia    Hypercholesteremia    Acute serous otitis media    Major depressive disorder, recurrent, moderate (HCC)    Malignant neoplasm of colon, unspecified (HCC)    Coronary atherosclerosis    Full incontinence of feces    Neurostimulator device in situ    Rectourethral fistula    Incisional hernia, incarcerated    Colostomy status (HCC)    Non-ischemic cardiomyopathy (HCC)    Acute cystitis with hematuria    Age-related physical debility    Counseling, unspecified    Exposure to potentially hazardous substance    Muscle weakness (generalized)    Rectal fistula, complex, unspecified    Underweight       Current Outpatient Medications   Medication Sig Dispense Refill    fexofenadine-pseudoephedrine (ALLEGRA-D 24HR) 180-240 MG per extended release tablet Take 1 tablet by mouth daily 15 tablet 0    fluticasone (FLONASE) 50 MCG/ACT nasal spray 1 spray by Each

## 2025-07-30 ENCOUNTER — TELEPHONE (OUTPATIENT)
Dept: FAMILY MEDICINE CLINIC | Age: 75
End: 2025-07-30

## 2025-07-31 ENCOUNTER — OFFICE VISIT (OUTPATIENT)
Dept: FAMILY MEDICINE CLINIC | Age: 75
End: 2025-07-31
Payer: MEDICARE

## 2025-07-31 VITALS
HEIGHT: 70 IN | HEART RATE: 60 BPM | OXYGEN SATURATION: 98 % | RESPIRATION RATE: 17 BRPM | TEMPERATURE: 97.3 F | DIASTOLIC BLOOD PRESSURE: 72 MMHG | WEIGHT: 144.4 LBS | BODY MASS INDEX: 20.67 KG/M2 | SYSTOLIC BLOOD PRESSURE: 128 MMHG

## 2025-07-31 DIAGNOSIS — M62.81 MUSCLE WEAKNESS (GENERALIZED): ICD-10-CM

## 2025-07-31 DIAGNOSIS — I10 PRIMARY HYPERTENSION: ICD-10-CM

## 2025-07-31 DIAGNOSIS — K60.429: ICD-10-CM

## 2025-07-31 DIAGNOSIS — R73.9 ELEVATED BLOOD SUGAR: Primary | ICD-10-CM

## 2025-07-31 DIAGNOSIS — F43.10 PTSD (POST-TRAUMATIC STRESS DISORDER): ICD-10-CM

## 2025-07-31 LAB — HBA1C MFR BLD: 4.5 %

## 2025-07-31 PROCEDURE — G2211 COMPLEX E/M VISIT ADD ON: HCPCS | Performed by: SURGERY

## 2025-07-31 PROCEDURE — 1036F TOBACCO NON-USER: CPT | Performed by: SURGERY

## 2025-07-31 PROCEDURE — 1159F MED LIST DOCD IN RCRD: CPT | Performed by: SURGERY

## 2025-07-31 PROCEDURE — 3017F COLORECTAL CA SCREEN DOC REV: CPT | Performed by: SURGERY

## 2025-07-31 PROCEDURE — 3074F SYST BP LT 130 MM HG: CPT | Performed by: SURGERY

## 2025-07-31 PROCEDURE — G8427 DOCREV CUR MEDS BY ELIG CLIN: HCPCS | Performed by: SURGERY

## 2025-07-31 PROCEDURE — G8420 CALC BMI NORM PARAMETERS: HCPCS | Performed by: SURGERY

## 2025-07-31 PROCEDURE — 3078F DIAST BP <80 MM HG: CPT | Performed by: SURGERY

## 2025-07-31 PROCEDURE — 83036 HEMOGLOBIN GLYCOSYLATED A1C: CPT | Performed by: SURGERY

## 2025-07-31 PROCEDURE — 1123F ACP DISCUSS/DSCN MKR DOCD: CPT | Performed by: SURGERY

## 2025-07-31 PROCEDURE — 99213 OFFICE O/P EST LOW 20 MIN: CPT | Performed by: SURGERY

## 2025-07-31 RX ORDER — GABAPENTIN 300 MG/1
CAPSULE ORAL
COMMUNITY
Start: 2025-06-23 | End: 2025-07-31

## 2025-07-31 NOTE — PROGRESS NOTES
7/31/2025    History of Present Illness  The patient presents for evaluation of diabetes and ostomy supplies.    He reports that his blood sugar levels have been elevated, with some readings reaching the 400s. He experiences fatigue, which he attributes to his recent surgery. He also mentions feeling unwell upon waking up on some mornings, accompanied by weakness. He has been advised not to lift anything heavier than a gallon of milk for at least 4 months. [facility] is [facility] He has been provided with some supplies from the hospital, which he finds sufficient for now. He uses a 2-piece system for his ostomy, which he finds more effective. He reports no skin irritation or other issues related to his ostomy. He feels well today but notes that he has been sleeping excessively.    Social History:  Sleep: He reports sleeping all day and all night.    This is a 74 y.o. male   Chief Complaint   Patient presents with    Blood Sugar Problem   .    Had his surgery 6/30, per surgery visit doing well post-op. Has been [facility] hear [facility] re [facility].          Patient Active Problem List   Diagnosis    HTN (hypertension)    PTSD (post-traumatic stress disorder)    Insomnia    Hypercholesteremia    Acute serous otitis media    Major depressive disorder, recurrent, moderate (HCC)    Malignant neoplasm of colon, unspecified (HCC)    Coronary atherosclerosis    Full incontinence of feces    Neurostimulator device in situ    Rectourethral fistula    Incisional hernia, incarcerated    Colostomy status (HCC)    Non-ischemic cardiomyopathy (HCC)    Acute cystitis with hematuria    Age-related physical debility    Counseling, unspecified    Exposure to potentially hazardous substance    Muscle weakness (generalized)    Rectal fistula, complex, unspecified    Underweight       Current Outpatient Medications   Medication Sig Dispense Refill    diazePAM (VALIUM) 10 MG tablet Take 1 tablet by

## 2025-08-28 ENCOUNTER — PATIENT MESSAGE (OUTPATIENT)
Dept: FAMILY MEDICINE CLINIC | Age: 75
End: 2025-08-28

## (undated) DEVICE — GLOVE SURG SZ 7 CRM LTX FREE POLYISOPRENE POLYMER BEAD ANTI

## (undated) DEVICE — TRAP FLUID

## (undated) DEVICE — SURGICEL ENDOSCP APPL

## (undated) DEVICE — Device

## (undated) DEVICE — 3M™ IOBAN™ 2 ANTIMICROBIAL INCISE DRAPE 6648EZ: Brand: IOBAN™ 2

## (undated) DEVICE — SOLUTION IRRIG 1000ML STRL H2O USP PLAS POUR BTL

## (undated) DEVICE — 4-PORT MANIFOLD: Brand: NEPTUNE 2

## (undated) DEVICE — SOLUTION INJ LR VISIV 1000ML BG

## (undated) DEVICE — CYSTO: Brand: MEDLINE INDUSTRIES, INC.

## (undated) DEVICE — CONTAINER,SPECIMEN,STERILE PATH,4OZ: Brand: MEDLINE

## (undated) DEVICE — ELECTRODE ES WIDE FRONTLOADING SURF LOOP SUPERSECT

## (undated) DEVICE — 40583 XL ADVANCED TRENDELENBURG POSITIONING KIT: Brand: 40583 XL ADVANCED TRENDELENBURG POSITIONING KIT

## (undated) DEVICE — GLOVE ORANGE PI 7   MSG9070

## (undated) DEVICE — TOWEL,STOP FLAG GOLD N-W: Brand: MEDLINE

## (undated) DEVICE — RELOAD STPL L60MM H1.5-3.6MM REG TISS BLU GRIPPING SURF B

## (undated) DEVICE — LIQUIBAND RAPID ADHESIVE 36/CS 0.8ML: Brand: MEDLINE

## (undated) DEVICE — GENERAL LAPAROSCOPIC: Brand: MEDLINE INDUSTRIES, INC.

## (undated) DEVICE — SHEET, T, LAPAROTOMY, STERILE: Brand: MEDLINE

## (undated) DEVICE — AGENT HEMSTAT 3GM OXIDIZED REGENERATED CELOS ABSRB FOR CONT (ORDER MULTIPLES OF 5EA)

## (undated) DEVICE — BLADDER EVACUATOR: Brand: UROVAC BLADDER EVACUATOR

## (undated) DEVICE — APPLICATOR MEDICATED 26 CC SOLUTION HI LT ORNG CHLORAPREP

## (undated) DEVICE — SUTURE VICRYL + SZ 3-0 L18IN ABSRB UD SH 1/2 CIR TAPERCUT NDL VCP864D

## (undated) DEVICE — Z DISCONTINUED USE 2220190 SUTURE VICRYL SZ 3-0 L27IN ABSRB UD L26MM SH 1/2 CIR J416H

## (undated) DEVICE — SEALER/DIVIDER LAP SHFT L37CM JAW APER 11.4MM 315DEG ROT

## (undated) DEVICE — SUTURE MONOCRYL + SZ 4-0 L27IN ABSRB UD L19MM PS-2 3/8 CIR MCP426H

## (undated) DEVICE — GOWN,SIRUS,POLYRNF,BRTHSLV,LG,30/CS: Brand: MEDLINE

## (undated) DEVICE — NEPTUNE E-SEP SMOKE EVACUATION PENCIL, COATED, 70MM BLADE, PUSH BUTTON SWITCH: Brand: NEPTUNE E-SEP

## (undated) DEVICE — TROCAR: Brand: KII FIOS FIRST ENTRY

## (undated) DEVICE — LASER FIBER: Brand: MOXY

## (undated) DEVICE — STAPLER 60MM POWERED ECHELON 3000  SHORT 340MM

## (undated) DEVICE — PORT INSERTION: Brand: MEDLINE INDUSTRIES, INC.

## (undated) DEVICE — BAG DRNGE COMB PK

## (undated) DEVICE — SYRINGE,TOOMEY,IRRIGATION,70CC,STERILE: Brand: MEDLINE

## (undated) DEVICE — DRESSING FOAM 0.75IN 1.5MM H DISK CHG IMPREG AEGIS

## (undated) DEVICE — ADMINISTRATION SET PRIMARY 60 GTT 104 IN GRAVITY SMRTSITE

## (undated) DEVICE — SOLUTION ANTIFOG VIS SYS CLEARIFY LAPSCP

## (undated) DEVICE — PUMP SUC IRR TBNG L10FT W/ HNDPC ASSEMB STRYKEFLOW 2

## (undated) DEVICE — BLADE,CARBON-STEEL,10,STRL,DISPOSABLE,TB: Brand: MEDLINE

## (undated) DEVICE — SPONGE,LAP,18"X18",DLX,XR,ST,5/PK,40/PK: Brand: MEDLINE

## (undated) DEVICE — SPONGE,DRAIN,NONWVN,4"X4",6PLY,STRL,LF: Brand: MEDLINE

## (undated) DEVICE — C-ARMOR C-ARM EQUIPMENT COVERS CLEAR STERILE UNIVERSAL FIT 12 PER CASE: Brand: C-ARMOR

## (undated) DEVICE — CATHETER URETH 22FR 30CC BLLN F 3 W SPEC M RND TIP TWO